# Patient Record
Sex: MALE | Race: WHITE | NOT HISPANIC OR LATINO | Employment: OTHER | URBAN - METROPOLITAN AREA
[De-identification: names, ages, dates, MRNs, and addresses within clinical notes are randomized per-mention and may not be internally consistent; named-entity substitution may affect disease eponyms.]

---

## 2017-09-24 ENCOUNTER — HOSPITAL ENCOUNTER (OUTPATIENT)
Facility: HOSPITAL | Age: 32
Discharge: PSYCHIATRIC OR SUBSTANCE USE FACILITY | End: 2017-09-26
Attending: EMERGENCY MEDICINE | Admitting: HOSPITALIST
Payer: MEDICAID

## 2017-09-24 DIAGNOSIS — T50.902A INTENTIONAL DRUG OVERDOSE, INITIAL ENCOUNTER: Primary | ICD-10-CM

## 2017-09-24 DIAGNOSIS — T50.901A OVERDOSE: ICD-10-CM

## 2017-09-24 LAB
ALBUMIN SERPL BCP-MCNC: 4 G/DL
ALP SERPL-CCNC: 67 U/L
ALT SERPL W/O P-5'-P-CCNC: 15 U/L
ANION GAP SERPL CALC-SCNC: 15 MMOL/L
APAP SERPL-MCNC: <3 UG/ML
AST SERPL-CCNC: 9 U/L
BASOPHILS # BLD AUTO: 0 K/UL
BASOPHILS NFR BLD: 0.4 %
BILIRUB SERPL-MCNC: 0.3 MG/DL
BUN SERPL-MCNC: 6 MG/DL
CALCIUM SERPL-MCNC: 9 MG/DL
CHLORIDE SERPL-SCNC: 104 MMOL/L
CO2 SERPL-SCNC: 22 MMOL/L
CREAT SERPL-MCNC: 0.8 MG/DL
DIFFERENTIAL METHOD: ABNORMAL
EOSINOPHIL # BLD AUTO: 0.1 K/UL
EOSINOPHIL NFR BLD: 0.6 %
ERYTHROCYTE [DISTWIDTH] IN BLOOD BY AUTOMATED COUNT: 14.4 %
EST. GFR  (AFRICAN AMERICAN): >60 ML/MIN/1.73 M^2
EST. GFR  (NON AFRICAN AMERICAN): >60 ML/MIN/1.73 M^2
ETHANOL SERPL-MCNC: <10 MG/DL
GLUCOSE SERPL-MCNC: 175 MG/DL
HCT VFR BLD AUTO: 43.1 %
HGB BLD-MCNC: 14.5 G/DL
LYMPHOCYTES # BLD AUTO: 1.4 K/UL
LYMPHOCYTES NFR BLD: 11.9 %
MCH RBC QN AUTO: 30.7 PG
MCHC RBC AUTO-ENTMCNC: 33.7 G/DL
MCV RBC AUTO: 91 FL
MONOCYTES # BLD AUTO: 0.7 K/UL
MONOCYTES NFR BLD: 5.8 %
NEUTROPHILS # BLD AUTO: 9.4 K/UL
NEUTROPHILS NFR BLD: 81.3 %
PLATELET # BLD AUTO: 239 K/UL
PMV BLD AUTO: 7.7 FL
POTASSIUM SERPL-SCNC: 3.3 MMOL/L
PROT SERPL-MCNC: 7.1 G/DL
RBC # BLD AUTO: 4.73 M/UL
SODIUM SERPL-SCNC: 141 MMOL/L
T4 FREE SERPL-MCNC: 1.31 NG/DL
TSH SERPL DL<=0.005 MIU/L-ACNC: 0.29 UIU/ML
WBC # BLD AUTO: 11.6 K/UL

## 2017-09-24 PROCEDURE — 96375 TX/PRO/DX INJ NEW DRUG ADDON: CPT

## 2017-09-24 PROCEDURE — 25000003 PHARM REV CODE 250: Performed by: EMERGENCY MEDICINE

## 2017-09-24 PROCEDURE — G0378 HOSPITAL OBSERVATION PER HR: HCPCS

## 2017-09-24 PROCEDURE — 93005 ELECTROCARDIOGRAM TRACING: CPT

## 2017-09-24 PROCEDURE — 80053 COMPREHEN METABOLIC PANEL: CPT

## 2017-09-24 PROCEDURE — 84439 ASSAY OF FREE THYROXINE: CPT

## 2017-09-24 PROCEDURE — 63600175 PHARM REV CODE 636 W HCPCS: Performed by: EMERGENCY MEDICINE

## 2017-09-24 PROCEDURE — 93010 ELECTROCARDIOGRAM REPORT: CPT | Mod: ,,, | Performed by: INTERNAL MEDICINE

## 2017-09-24 PROCEDURE — 63600175 PHARM REV CODE 636 W HCPCS: Performed by: NURSE PRACTITIONER

## 2017-09-24 PROCEDURE — 96365 THER/PROPH/DIAG IV INF INIT: CPT

## 2017-09-24 PROCEDURE — 84443 ASSAY THYROID STIM HORMONE: CPT

## 2017-09-24 PROCEDURE — 20000000 HC ICU ROOM

## 2017-09-24 PROCEDURE — 36415 COLL VENOUS BLD VENIPUNCTURE: CPT

## 2017-09-24 PROCEDURE — 99285 EMERGENCY DEPT VISIT HI MDM: CPT

## 2017-09-24 PROCEDURE — 80329 ANALGESICS NON-OPIOID 1 OR 2: CPT

## 2017-09-24 PROCEDURE — 85025 COMPLETE CBC W/AUTO DIFF WBC: CPT

## 2017-09-24 PROCEDURE — 25000003 PHARM REV CODE 250: Performed by: NURSE PRACTITIONER

## 2017-09-24 PROCEDURE — 80320 DRUG SCREEN QUANTALCOHOLS: CPT

## 2017-09-24 PROCEDURE — 96361 HYDRATE IV INFUSION ADD-ON: CPT

## 2017-09-24 RX ORDER — ONDANSETRON 2 MG/ML
8 INJECTION INTRAMUSCULAR; INTRAVENOUS EVERY 8 HOURS PRN
Status: DISCONTINUED | OUTPATIENT
Start: 2017-09-24 | End: 2017-09-26 | Stop reason: HOSPADM

## 2017-09-24 RX ORDER — POTASSIUM CHLORIDE 20 MEQ/1
40 TABLET, EXTENDED RELEASE ORAL ONCE
Status: COMPLETED | OUTPATIENT
Start: 2017-09-25 | End: 2017-09-25

## 2017-09-24 RX ORDER — SODIUM CHLORIDE 9 MG/ML
INJECTION, SOLUTION INTRAVENOUS CONTINUOUS
Status: DISCONTINUED | OUTPATIENT
Start: 2017-09-24 | End: 2017-09-25

## 2017-09-24 RX ORDER — NALOXONE HCL 0.4 MG/ML
0.4 VIAL (ML) INJECTION
Status: DISCONTINUED | OUTPATIENT
Start: 2017-09-24 | End: 2017-09-26 | Stop reason: HOSPADM

## 2017-09-24 RX ORDER — NALOXONE HCL 0.4 MG/ML
2 VIAL (ML) INJECTION
Status: COMPLETED | OUTPATIENT
Start: 2017-09-24 | End: 2017-09-24

## 2017-09-24 RX ORDER — ACETAMINOPHEN 500 MG
1000 TABLET ORAL EVERY 6 HOURS PRN
Status: DISCONTINUED | OUTPATIENT
Start: 2017-09-24 | End: 2017-09-26 | Stop reason: HOSPADM

## 2017-09-24 RX ORDER — POTASSIUM CHLORIDE 7.45 MG/ML
10 INJECTION INTRAVENOUS
Status: DISCONTINUED | OUTPATIENT
Start: 2017-09-24 | End: 2017-09-24

## 2017-09-24 RX ORDER — AMOXICILLIN 250 MG
1 CAPSULE ORAL 2 TIMES DAILY
Status: DISCONTINUED | OUTPATIENT
Start: 2017-09-24 | End: 2017-09-26 | Stop reason: HOSPADM

## 2017-09-24 RX ADMIN — PIPERACILLIN AND TAZOBACTAM 4.5 G: 4; .5 INJECTION, POWDER, LYOPHILIZED, FOR SOLUTION INTRAVENOUS; PARENTERAL at 10:09

## 2017-09-24 RX ADMIN — NALOXONE HYDROCHLORIDE 2 MG: 0.4 INJECTION, SOLUTION INTRAMUSCULAR; INTRAVENOUS; SUBCUTANEOUS at 06:09

## 2017-09-24 RX ADMIN — SODIUM CHLORIDE: 0.9 INJECTION, SOLUTION INTRAVENOUS at 10:09

## 2017-09-24 RX ADMIN — SODIUM CHLORIDE 1000 ML: 0.9 INJECTION, SOLUTION INTRAVENOUS at 08:09

## 2017-09-24 NOTE — ED NOTES
Family at bedside. Patient is more awake at this time secondary to medication. Patient reports he took 50 Robaxin pills and used IV heroin. Patient has home medication of Suboxone which he denies he used today. Patient reports he was attempting to harm himself because his girlfriend slept with someone and he was upset.

## 2017-09-24 NOTE — ED PROVIDER NOTES
"Encounter Date: 9/24/2017    SCRIBE #1 NOTE: I, Yanelis Guadarrama , am scribing for, and in the presence of,  Dr. Belcher . I have scribed the entire note.       History     Chief Complaint   Patient presents with    Drug Overdose     took Robaxin and heroin         09/24/2017  6:48 PM     Chief Complaint: Drug OD      The patient is a 32 y.o. male who is presenting to the ED per EMS s/p intentional drug OD. Per EMS and pt's family members, the pt took x 50 Robaxin (dosage unknown) and shot up heroin in his L EJ at approximately 17:00 this evening. At arrival to scene EMS states that the pt was minimally responsive to painful stimuli with improvement in GCS s/p .5mg Narcan intranasal and 1.5 mg given IV. While in ED the pt admits to SI stating that he "was trying to hurt himself" after learning that his girlfriend slept with another man. No HI, delusions, auditory hallucinations, or visual hallucinations. Pertinent past surgical hx includes anxiety. No pertinent past surgical hx.           The history is provided by the patient and medical records.     Review of patient's allergies indicates:  No Known Allergies  Past Medical History:   Diagnosis Date    Anxiety     depression    Breast abscess in male     right     Past Surgical History:   Procedure Laterality Date    BREAST SURGERY      I & D right     History reviewed. No pertinent family history.  Social History   Substance Use Topics    Smoking status: Current Every Day Smoker     Packs/day: 1.00     Years: 15.00    Smokeless tobacco: Never Used    Alcohol use No     Review of Systems   Unable to perform ROS: Mental status change       Physical Exam     Initial Vitals [09/24/17 1821]   BP Pulse Resp Temp SpO2   130/79 95 (!) 22 -- 100 %      MAP       96         Physical Exam    Nursing note and vitals reviewed.  Constitutional: He appears lethargic.   HENT:   Head: Normocephalic and atraumatic.   Mouth/Throat: Oropharynx is clear and moist.   Eyes: EOM are " normal. Pupils are equal, round, and reactive to light.   Pupils are 4 mm in diameter bilaterally.    Neck: Normal range of motion.   Cardiovascular: Normal rate, regular rhythm, normal heart sounds and intact distal pulses. Exam reveals no gallop and no friction rub.    No murmur heard.  Pulmonary/Chest: Breath sounds normal. He has no wheezes. He has no rhonchi. He has no rales.   Neurological: He appears lethargic. GCS eye subscore is 4. GCS verbal subscore is 5. GCS motor subscore is 6.   Intoxicated appearing. Oriented to year and person.    Skin: Skin is warm and dry. Capillary refill takes less than 2 seconds.         ED Course   Procedures  Labs Reviewed   CBC W/ AUTO DIFFERENTIAL - Abnormal; Notable for the following:        Result Value    MPV 7.7 (*)     Gran # 9.4 (*)     Gran% 81.3 (*)     Lymph% 11.9 (*)     All other components within normal limits   COMPREHENSIVE METABOLIC PANEL - Abnormal; Notable for the following:     Potassium 3.3 (*)     CO2 22 (*)     Glucose 175 (*)     AST 9 (*)     All other components within normal limits   TSH - Abnormal; Notable for the following:     TSH 0.290 (*)     All other components within normal limits   ACETAMINOPHEN LEVEL - Abnormal; Notable for the following:     Acetaminophen (Tylenol), Serum <3.0 (*)     All other components within normal limits   ALCOHOL,MEDICAL (ETHANOL)   T4, FREE   URINALYSIS   DRUG SCREEN PANEL, URINE EMERGENCY     EKG Readings: (Independently Interpreted)   Initial Reading: No STEMI.   Sinus tachycardia, 110 BPM, normal axis, normal intervals, nonspecific T-wave abnormality, normal ST segments       X-Rays:   Independently Interpreted Readings:   Chest X-Ray: Right perihilar opacity concerning for possible aspiration     Medical Decision Making:   History:   Old Medical Records: I decided to obtain old medical records.  Initial Assessment:   This is an emergent evaluation for psychiatric evaluation.  The pt presents for evaluation of  32-year-old male with intentional drug overdose because his girlfriend cheated on him and he was angry and hurt.    I feel the pt is a danger to himself and potentially suicidal and pt was placed under PEC with direct observation.  Medical workup was initiated to evaluate for organic etiologies.  Pt's etoh level was negative  I do not believe the pt has metabolic encephalopathy, CVA, severe electrolyte derrangement, drug induced temporary psychosis.    Patient took allegedly over 50 Robaxin pills putting his life at risk.  He is quite under the influence.  Initially tachycardic concerning for anticholinergic syndrome which has improved with supportive therapy.  He has been maintaining his oxygen saturations and has not required intubation.  He is still very altered.  I cannot medically clear patient.  He will be admitted to the intensive care unit.  Patient has been placed under a physician's emergency certificate.              Scribe Attestation:   Scribe #1: I performed the above scribed service and the documentation accurately describes the services I performed. I attest to the accuracy of the note.    Attending Attestation:           Physician Attestation for Scribe:  Physician Attestation Statement for Scribe #1: I, Dr. Belcher , reviewed documentation, as scribed by Yanelis Guadarrama  in my presence, and it is both accurate and complete.                 ED Course      Clinical Impression:   Diagnoses of Overdose and Intentional drug overdose, initial encounter were pertinent to this visit.                           Alfredo Belcher MD  09/24/17 3577

## 2017-09-25 PROBLEM — E87.6 HYPOKALEMIA: Status: ACTIVE | Noted: 2017-09-25

## 2017-09-25 PROBLEM — F19.90 IVDU (INTRAVENOUS DRUG USER): Status: ACTIVE | Noted: 2017-09-25

## 2017-09-25 PROBLEM — F11.20 UNCOMPLICATED OPIOID DEPENDENCE: Status: ACTIVE | Noted: 2017-09-25

## 2017-09-25 PROBLEM — B19.20 HEPATITIS C VIRUS INFECTION WITHOUT HEPATIC COMA: Status: ACTIVE | Noted: 2017-09-25

## 2017-09-25 PROBLEM — G89.4 CHRONIC PAIN SYNDROME: Status: ACTIVE | Noted: 2017-09-25

## 2017-09-25 PROBLEM — J69.0 ASPIRATION PNEUMONIA: Status: ACTIVE | Noted: 2017-09-25

## 2017-09-25 LAB
ALBUMIN SERPL BCP-MCNC: 3.3 G/DL
ALP SERPL-CCNC: 59 U/L
ALT SERPL W/O P-5'-P-CCNC: 10 U/L
AMPHET+METHAMPHET UR QL: NEGATIVE
ANION GAP SERPL CALC-SCNC: 7 MMOL/L
AST SERPL-CCNC: 11 U/L
BARBITURATES UR QL SCN>200 NG/ML: NORMAL
BASOPHILS # BLD AUTO: 0.2 K/UL
BASOPHILS NFR BLD: 2.3 %
BENZODIAZ UR QL SCN>200 NG/ML: NEGATIVE
BILIRUB SERPL-MCNC: 0.5 MG/DL
BILIRUB UR QL STRIP: NEGATIVE
BUN SERPL-MCNC: 4 MG/DL
BZE UR QL SCN: NEGATIVE
CALCIUM SERPL-MCNC: 8.7 MG/DL
CANNABINOIDS UR QL SCN: NEGATIVE
CHLORIDE SERPL-SCNC: 108 MMOL/L
CLARITY UR: CLEAR
CO2 SERPL-SCNC: 24 MMOL/L
COLOR UR: YELLOW
CREAT SERPL-MCNC: 0.7 MG/DL
CREAT UR-MCNC: 155 MG/DL
DIFFERENTIAL METHOD: ABNORMAL
EOSINOPHIL # BLD AUTO: 0.2 K/UL
EOSINOPHIL NFR BLD: 1.8 %
ERYTHROCYTE [DISTWIDTH] IN BLOOD BY AUTOMATED COUNT: 14.2 %
EST. GFR  (AFRICAN AMERICAN): >60 ML/MIN/1.73 M^2
EST. GFR  (NON AFRICAN AMERICAN): >60 ML/MIN/1.73 M^2
GLUCOSE SERPL-MCNC: 119 MG/DL
GLUCOSE UR QL STRIP: NEGATIVE
HCT VFR BLD AUTO: 38.9 %
HGB BLD-MCNC: 13.1 G/DL
HGB UR QL STRIP: NEGATIVE
HIV1+2 IGG SERPL QL IA.RAPID: NEGATIVE
KETONES UR QL STRIP: NEGATIVE
LEUKOCYTE ESTERASE UR QL STRIP: NEGATIVE
LYMPHOCYTES # BLD AUTO: 2.4 K/UL
LYMPHOCYTES NFR BLD: 24.5 %
MAGNESIUM SERPL-MCNC: 2.3 MG/DL
MCH RBC QN AUTO: 30.7 PG
MCHC RBC AUTO-ENTMCNC: 33.6 G/DL
MCV RBC AUTO: 91 FL
METHADONE UR QL SCN>300 NG/ML: NEGATIVE
MONOCYTES # BLD AUTO: 0.6 K/UL
MONOCYTES NFR BLD: 6 %
NEUTROPHILS # BLD AUTO: 6.4 K/UL
NEUTROPHILS NFR BLD: 65.4 %
NITRITE UR QL STRIP: NEGATIVE
OPIATES UR QL SCN: NORMAL
PCP UR QL SCN>25 NG/ML: NEGATIVE
PH UR STRIP: 5 [PH] (ref 5–8)
PHOSPHATE SERPL-MCNC: 2.6 MG/DL
PLATELET # BLD AUTO: 223 K/UL
PMV BLD AUTO: 8.3 FL
POTASSIUM SERPL-SCNC: 3.8 MMOL/L
PROT SERPL-MCNC: 6.1 G/DL
PROT UR QL STRIP: ABNORMAL
RBC # BLD AUTO: 4.26 M/UL
SODIUM SERPL-SCNC: 139 MMOL/L
SP GR UR STRIP: 1.02 (ref 1–1.03)
TOXICOLOGY INFORMATION: NORMAL
URN SPEC COLLECT METH UR: ABNORMAL
UROBILINOGEN UR STRIP-ACNC: 1 EU/DL
WBC # BLD AUTO: 9.7 K/UL

## 2017-09-25 PROCEDURE — 80307 DRUG TEST PRSMV CHEM ANLYZR: CPT

## 2017-09-25 PROCEDURE — S4991 NICOTINE PATCH NONLEGEND: HCPCS | Performed by: NURSE PRACTITIONER

## 2017-09-25 PROCEDURE — 63600175 PHARM REV CODE 636 W HCPCS: Performed by: PSYCHIATRY & NEUROLOGY

## 2017-09-25 PROCEDURE — 25000003 PHARM REV CODE 250: Performed by: HOSPITALIST

## 2017-09-25 PROCEDURE — 86703 HIV-1/HIV-2 1 RESULT ANTBDY: CPT

## 2017-09-25 PROCEDURE — 85025 COMPLETE CBC W/AUTO DIFF WBC: CPT

## 2017-09-25 PROCEDURE — 36415 COLL VENOUS BLD VENIPUNCTURE: CPT

## 2017-09-25 PROCEDURE — 25000003 PHARM REV CODE 250: Performed by: PSYCHIATRY & NEUROLOGY

## 2017-09-25 PROCEDURE — 84100 ASSAY OF PHOSPHORUS: CPT

## 2017-09-25 PROCEDURE — 81003 URINALYSIS AUTO W/O SCOPE: CPT

## 2017-09-25 PROCEDURE — 80053 COMPREHEN METABOLIC PANEL: CPT

## 2017-09-25 PROCEDURE — 20000000 HC ICU ROOM

## 2017-09-25 PROCEDURE — G0378 HOSPITAL OBSERVATION PER HR: HCPCS

## 2017-09-25 PROCEDURE — 63600175 PHARM REV CODE 636 W HCPCS: Performed by: HOSPITALIST

## 2017-09-25 PROCEDURE — 83735 ASSAY OF MAGNESIUM: CPT

## 2017-09-25 PROCEDURE — 94640 AIRWAY INHALATION TREATMENT: CPT

## 2017-09-25 PROCEDURE — 94761 N-INVAS EAR/PLS OXIMETRY MLT: CPT

## 2017-09-25 PROCEDURE — 25000242 PHARM REV CODE 250 ALT 637 W/ HCPCS: Performed by: HOSPITALIST

## 2017-09-25 PROCEDURE — 25000003 PHARM REV CODE 250: Performed by: NURSE PRACTITIONER

## 2017-09-25 RX ORDER — HALOPERIDOL 5 MG/ML
5 INJECTION INTRAMUSCULAR EVERY 6 HOURS PRN
Status: DISCONTINUED | OUTPATIENT
Start: 2017-09-25 | End: 2017-09-26 | Stop reason: HOSPADM

## 2017-09-25 RX ORDER — IPRATROPIUM BROMIDE AND ALBUTEROL SULFATE 2.5; .5 MG/3ML; MG/3ML
3 SOLUTION RESPIRATORY (INHALATION) EVERY 6 HOURS
Status: DISCONTINUED | OUTPATIENT
Start: 2017-09-25 | End: 2017-09-26

## 2017-09-25 RX ORDER — GABAPENTIN 300 MG/1
CAPSULE ORAL
Status: DISPENSED
Start: 2017-09-25 | End: 2017-09-26

## 2017-09-25 RX ORDER — BUPRENORPHINE AND NALOXONE 8; 2 MG/1; MG/1
1 FILM, SOLUBLE BUCCAL; SUBLINGUAL 2 TIMES DAILY
COMMUNITY
End: 2019-08-09

## 2017-09-25 RX ORDER — CLONIDINE HYDROCHLORIDE 0.1 MG/1
TABLET ORAL
Status: DISPENSED
Start: 2017-09-25 | End: 2017-09-26

## 2017-09-25 RX ORDER — HALOPERIDOL 5 MG/ML
INJECTION INTRAMUSCULAR
Status: DISPENSED
Start: 2017-09-25 | End: 2017-09-26

## 2017-09-25 RX ORDER — MOXIFLOXACIN HYDROCHLORIDE 400 MG/1
400 TABLET ORAL DAILY
Status: DISCONTINUED | OUTPATIENT
Start: 2017-09-25 | End: 2017-09-26 | Stop reason: HOSPADM

## 2017-09-25 RX ORDER — CLONIDINE HYDROCHLORIDE 0.1 MG/1
0.1 TABLET ORAL EVERY 8 HOURS
Status: DISCONTINUED | OUTPATIENT
Start: 2017-09-25 | End: 2017-09-26 | Stop reason: HOSPADM

## 2017-09-25 RX ORDER — GABAPENTIN 300 MG/1
600 CAPSULE ORAL 3 TIMES DAILY
Status: DISCONTINUED | OUTPATIENT
Start: 2017-09-25 | End: 2017-09-26 | Stop reason: HOSPADM

## 2017-09-25 RX ORDER — IBUPROFEN 200 MG
1 TABLET ORAL DAILY
Status: DISCONTINUED | OUTPATIENT
Start: 2017-09-25 | End: 2017-09-25

## 2017-09-25 RX ORDER — BUPRENORPHINE AND NALOXONE 8; 2 MG/1; MG/1
FILM, SOLUBLE BUCCAL; SUBLINGUAL
Status: DISPENSED
Start: 2017-09-25 | End: 2017-09-26

## 2017-09-25 RX ORDER — IBUPROFEN 200 MG
1 TABLET ORAL DAILY
Status: DISCONTINUED | OUTPATIENT
Start: 2017-09-25 | End: 2017-09-26 | Stop reason: HOSPADM

## 2017-09-25 RX ORDER — BUPRENORPHINE AND NALOXONE 8; 2 MG/1; MG/1
1 FILM, SOLUBLE BUCCAL; SUBLINGUAL DAILY
Status: DISCONTINUED | OUTPATIENT
Start: 2017-09-25 | End: 2017-09-26 | Stop reason: HOSPADM

## 2017-09-25 RX ADMIN — CLONIDINE HYDROCHLORIDE 0.1 MG: 0.1 TABLET ORAL at 09:09

## 2017-09-25 RX ADMIN — HALOPERIDOL LACTATE 5 MG: 5 INJECTION, SOLUTION INTRAMUSCULAR at 09:09

## 2017-09-25 RX ADMIN — CLONIDINE HYDROCHLORIDE 0.1 MG: 0.1 TABLET ORAL at 03:09

## 2017-09-25 RX ADMIN — GABAPENTIN 600 MG: 300 CAPSULE ORAL at 03:09

## 2017-09-25 RX ADMIN — IPRATROPIUM BROMIDE AND ALBUTEROL SULFATE 3 ML: .5; 3 SOLUTION RESPIRATORY (INHALATION) at 11:09

## 2017-09-25 RX ADMIN — NICOTINE 1 PATCH: 21 PATCH, EXTENDED RELEASE TRANSDERMAL at 05:09

## 2017-09-25 RX ADMIN — BUPRENORPHINE HYDROCHLORIDE, NALOXONE HYDROCHLORIDE 1 EACH: 8; 2 FILM, SOLUBLE BUCCAL; SUBLINGUAL at 03:09

## 2017-09-25 RX ADMIN — MOXIFLOXACIN HYDROCHLORIDE 400 MG: 400 TABLET, FILM COATED ORAL at 09:09

## 2017-09-25 RX ADMIN — POTASSIUM CHLORIDE 40 MEQ: 1500 TABLET, EXTENDED RELEASE ORAL at 12:09

## 2017-09-25 RX ADMIN — STANDARDIZED SENNA CONCENTRATE AND DOCUSATE SODIUM 1 TABLET: 8.6; 5 TABLET, FILM COATED ORAL at 12:09

## 2017-09-25 RX ADMIN — GABAPENTIN 600 MG: 300 CAPSULE ORAL at 09:09

## 2017-09-25 NOTE — ED NOTES
Poison Control Contacted, symptomatic control only. Do not recommend charcoal. Monitor for agitation or seizures. Obtain Psych Eval, Tylenol and Urine Drug levels.

## 2017-09-25 NOTE — PROGRESS NOTES
Family at bedside as Nilsa King NP talks with the pt.  Pt agreeable to mother remaining in room for talk.  Vitals remain stable.  Pt somewhat calm and cooperative.

## 2017-09-25 NOTE — PROGRESS NOTES
Family in to bring pt food and visit.  Pt easily becomes agitated then switches back to calm and cooperative in seconds.  Rules and guidelines of staying in the ICU as a PEC pt explained to pt and family.  Vital signs stable.  Pt able to carry on clear conversation.  No acute distress noted.  Sitter outside room.

## 2017-09-25 NOTE — PLAN OF CARE
09/25/17 0445   Patient Assessment/Suction   Level of Consciousness (AVPU) alert   Respiratory Effort Normal;Unlabored   Expansion/Accessory Muscles/Retractions no use of accessory muscles   PRE-TX-O2-ETCO2   O2 Device (Oxygen Therapy) room air   SpO2 96 %   Pulse 71   Resp 13   Pt tolerates RA well.

## 2017-09-25 NOTE — HPI
"Srinivasa Bui is a 32 y.o. Male with PMHx significant for anxiety and opioid dependence, as well as IVDU and recently diagnosed Hep C.  He was admitted to the service of hospital medicine PEC'd after intentional OD.  He presented to the ED per EMS s/p intentional drug OD. Per EMS and pt's family members, the pt took x 50 Robaxin (dosage unknown)  at approximately 17:00 this evening. At arrival to scene EMS states that the pt was minimally responsive to painful stimuli with improvement in GCS s/p .5mg Narcan intranasal and 1.5 mg given IV. While in ED the pt admits to SI stating that he "was trying to hurt himself" after learning that his girlfriend slept with another man. No HI, delusions, auditory hallucinations, or visual hallucinations. At present, he admits to taking medication because he was angry but denies intent to self harm.  Reports history of SI with 2 previous inpatient psychiatric hospitalizations.  He reports he is being managed by [facility] for his heroin addiction and is receiving vivitrol injections.  He reports he injected heroin into his LEFT neck at about noon today, however he denies any "real high" because of the vivitrol.  Prior to that, he last used 1 week ago.  He denies sharing needles.  He endorses he was diagnosed with Hep C last week at [facility].  He also reports 2-3 days of cough with thick, brown sputum production.  Denies fever or chills.  No chest pain.  Other pertinent history as below:  "

## 2017-09-25 NOTE — PROGRESS NOTES
09/25/17 1138   Patient Assessment/Suction   Level of Consciousness (AVPU) alert   All Lung Fields Breath Sounds clear   Cough Type none   PRE-TX-O2-ETCO2   O2 Device (Oxygen Therapy) room air   SpO2 98 %   Pulse Oximetry Type Continuous   $ Pulse Oximetry - Multiple Charge Pulse Oximetry - Multiple   Pulse 75   Resp 14   Aerosol Therapy   $ Aerosol Therapy Charges Aerosol Treatment   Respiratory Treatment Status given   SVN/Inhaler Treatment Route mask   Position During Treatment HOB at 30 degrees   Patient Tolerance good   Post-Treatment   Post-treatment Heart Rate (beats/min) 78   Post-treatment Resp Rate (breaths/min) 15   All Fields Breath Sounds clear

## 2017-09-25 NOTE — SUBJECTIVE & OBJECTIVE
Past Medical History:   Diagnosis Date    Anxiety     depression    Breast abscess in male     right    Opioid dependence        Past Surgical History:   Procedure Laterality Date    BREAST SURGERY      I & D right       Review of patient's allergies indicates:  No Known Allergies    No current facility-administered medications on file prior to encounter.      Current Outpatient Prescriptions on File Prior to Encounter   Medication Sig    cyclobenzaprine (FLEXERIL) 5 MG tablet Take 5 mg by mouth 3 (three) times daily as needed for Muscle spasms.    gabapentin (NEURONTIN) 300 MG capsule Take 300 mg by mouth 3 (three) times daily.    hydrocodone-acetaminophen 7.5-325mg (NORCO) 7.5-325 mg per tablet Take 1 tablet by mouth every 6 (six) hours as needed for Pain.     Family History     Problem Relation (Age of Onset)    Diabetes Father    Heart disease Father        Social History Main Topics    Smoking status: Current Every Day Smoker     Packs/day: 2.00     Years: 15.00    Smokeless tobacco: Never Used    Alcohol use Yes      Comment: occasional    Drug use:      Types: Heroin    Sexual activity: Yes     Review of Systems   Constitutional: Negative for activity change, appetite change, chills and fever.   HENT: Negative for dental problem, hearing loss, sinus pressure, sore throat and trouble swallowing.    Eyes: Negative for photophobia and visual disturbance.   Respiratory: Positive for cough, shortness of breath and wheezing.    Cardiovascular: Negative for chest pain, palpitations and leg swelling.   Gastrointestinal: Negative for abdominal distention, constipation, diarrhea, nausea and vomiting.   Genitourinary: Negative for difficulty urinating, dysuria, flank pain and hematuria.   Musculoskeletal: Negative for arthralgias, back pain and neck pain.   Neurological: Positive for headaches. Negative for dizziness, seizures, weakness and light-headedness.   Psychiatric/Behavioral: Positive for agitation,  self-injury and suicidal ideas. The patient is nervous/anxious.      Objective:     Vital Signs (Most Recent):  Temp: 97.6 °F (36.4 °C) (09/24/17 2317)  Pulse: 90 (09/24/17 2317)  Resp: 18 (09/24/17 2317)  BP: (!) 125/91 (09/24/17 2323)  SpO2: (!) 93 % (09/24/17 2317) Vital Signs (24h Range):  Temp:  [97.6 °F (36.4 °C)] 97.6 °F (36.4 °C)  Pulse:  [76-95] 90  Resp:  [18-22] 18  SpO2:  [92 %-100 %] 93 %  BP: (101-130)/(55-91) 125/91     Weight: 86.1 kg (189 lb 13.1 oz)  Body mass index is 23.11 kg/m².    Physical Exam   Constitutional: He appears well-developed and well-nourished. No distress.   HENT:   Head: Normocephalic and atraumatic.   Eyes: Conjunctivae and EOM are normal. Pupils are equal, round, and reactive to light.   Neck: Normal range of motion. Neck supple. No thyromegaly present.   Cardiovascular: Normal rate, regular rhythm, normal heart sounds and intact distal pulses.    No murmur heard.  Pulmonary/Chest: Effort normal. He has wheezes (diffuse expiratory wheezing).   RIGHT lobar rhonchi     Abdominal: Soft. Bowel sounds are normal. He exhibits no distension. There is no tenderness.   Musculoskeletal: Normal range of motion. He exhibits no edema or tenderness.   Neurological: He is alert. No cranial nerve deficit.   Drowsy; slurred speech.  Alert, oriented x 3.     Skin: Skin is warm and dry. Capillary refill takes less than 2 seconds.   Multiple tattoos; small punctate area of ecchymosis to LEFT EJ.   Psychiatric:   Admits to history of SI; denies SI or SA with drug overdose.  Lacks insight.        Significant Labs:   CBC:   Recent Labs  Lab 09/24/17 1904   WBC 11.60   HGB 14.5   HCT 43.1        CMP:   Recent Labs  Lab 09/24/17 1904      K 3.3*      CO2 22*   *   BUN 6   CREATININE 0.8   CALCIUM 9.0   PROT 7.1   ALBUMIN 4.0   BILITOT 0.3   ALKPHOS 67   AST 9*   ALT 15   ANIONGAP 15   EGFRNONAA >60        Ref. Range 9/24/2017 19:04   TSH Latest Ref Range: 0.400 - 4.000  uIU/mL 0.290 (L)   Free T4 Latest Ref Range: 0.71 - 1.51 ng/dL 1.31   Acetaminophen (Tylenol), Serum Latest Ref Range: 10.0 - 20.0 ug/mL <3.0 (L)   Alcohol, Medical, Serum Latest Ref Range: <10 mg/dL <10     Significant Imaging:   CXR: possible RML infiltrate (my interpretation)

## 2017-09-25 NOTE — PROGRESS NOTES
Pt resting well.  Appears asleep.  Vitals remain stable.  Continuing to monitor.  Sitter remains at door of room.  Curtain drawn but not closed per policy.

## 2017-09-25 NOTE — PROGRESS NOTES
09/25/17 0755   Patient Assessment/Suction   Level of Consciousness (AVPU) alert   PRE-TX-O2-ETCO2   O2 Device (Oxygen Therapy) room air   SpO2 97 %   Pulse Oximetry Type Continuous   $ Pulse Oximetry - Multiple Charge Pulse Oximetry - Multiple   Pulse 74   Resp 15

## 2017-09-25 NOTE — H&P
"[facility] Hospital Medicine  History & Physical    Patient Name: Srinivasa Bui  MRN: [id number]  Admission Date: 9/24/2017  Attending Physician: Niru Ochoa MD   Primary Care Provider: Primary Doctor No         Patient information was obtained from patient, parent and ER records.     Subjective:     Principal Problem:Intentional drug overdose    Chief Complaint:   Chief Complaint   Patient presents with    Drug Overdose     took Robaxin and heroin        HPI: Srinivasa Bui is a 32 y.o. Male with PMHx significant for anxiety and opioid dependence, as well as IVDU and recently diagnosed Hep C.  He was admitted to the service of hospital medicine PEC'd after intentional OD.  He presented to the ED per EMS s/p intentional drug OD. Per EMS and pt's family members, the pt took x 50 Robaxin (dosage unknown)  at approximately 17:00 this evening. At arrival to scene EMS states that the pt was minimally responsive to painful stimuli with improvement in GCS s/p .5mg Narcan intranasal and 1.5 mg given IV. While in ED the pt admits to SI stating that he "was trying to hurt himself" after learning that his girlfriend slept with another man. No HI, delusions, auditory hallucinations, or visual hallucinations. At present, he admits to taking medication because he was angry but denies intent to self harm.  Reports history of SI with 2 previous inpatient psychiatric hospitalizations.  He reports he is being managed by [facility] for his heroin addiction and is receiving vivitrol injections.  He reports he injected heroin into his LEFT neck at about noon today, however he denies any "real high" because of the vivitrol.  Prior to that, he last used 1 week ago.  He denies sharing needles.  He endorses he was diagnosed with Hep C last week at [facility].  He also reports 2-3 days of cough with thick, brown sputum production.  Denies fever or chills.  No chest pain.  Other pertinent history as below:    Past " Medical History:   Diagnosis Date    Anxiety     depression    Breast abscess in male     right    Opioid dependence        Past Surgical History:   Procedure Laterality Date    BREAST SURGERY      I & D right       Review of patient's allergies indicates:  No Known Allergies    No current facility-administered medications on file prior to encounter.      Current Outpatient Prescriptions on File Prior to Encounter   Medication Sig    cyclobenzaprine (FLEXERIL) 5 MG tablet Take 5 mg by mouth 3 (three) times daily as needed for Muscle spasms.    gabapentin (NEURONTIN) 300 MG capsule Take 300 mg by mouth 3 (three) times daily.    hydrocodone-acetaminophen 7.5-325mg (NORCO) 7.5-325 mg per tablet Take 1 tablet by mouth every 6 (six) hours as needed for Pain.     Family History     Problem Relation (Age of Onset)    Diabetes Father    Heart disease Father        Social History Main Topics    Smoking status: Current Every Day Smoker     Packs/day: 2.00     Years: 15.00    Smokeless tobacco: Never Used    Alcohol use Yes      Comment: occasional    Drug use:      Types: Heroin    Sexual activity: Yes     Review of Systems   Constitutional: Negative for activity change, appetite change, chills and fever.   HENT: Negative for dental problem, hearing loss, sinus pressure, sore throat and trouble swallowing.    Eyes: Negative for photophobia and visual disturbance.   Respiratory: Positive for cough, shortness of breath and wheezing.    Cardiovascular: Negative for chest pain, palpitations and leg swelling.   Gastrointestinal: Negative for abdominal distention, constipation, diarrhea, nausea and vomiting.   Genitourinary: Negative for difficulty urinating, dysuria, flank pain and hematuria.   Musculoskeletal: Negative for arthralgias, back pain and neck pain.   Neurological: Positive for headaches. Negative for dizziness, seizures, weakness and light-headedness.   Psychiatric/Behavioral: Positive for agitation,  self-injury and suicidal ideas. The patient is nervous/anxious.      Objective:     Vital Signs (Most Recent):  Temp: 97.6 °F (36.4 °C) (09/24/17 2317)  Pulse: 90 (09/24/17 2317)  Resp: 18 (09/24/17 2317)  BP: (!) 125/91 (09/24/17 2323)  SpO2: (!) 93 % (09/24/17 2317) Vital Signs (24h Range):  Temp:  [97.6 °F (36.4 °C)] 97.6 °F (36.4 °C)  Pulse:  [76-95] 90  Resp:  [18-22] 18  SpO2:  [92 %-100 %] 93 %  BP: (101-130)/(55-91) 125/91     Weight: 86.1 kg (189 lb 13.1 oz)  Body mass index is 23.11 kg/m².    Physical Exam   Constitutional: He appears well-developed and well-nourished. No distress.   HENT:   Head: Normocephalic and atraumatic.   Eyes: Conjunctivae and EOM are normal. Pupils are equal, round, and reactive to light.   Neck: Normal range of motion. Neck supple. No thyromegaly present.   Cardiovascular: Normal rate, regular rhythm, normal heart sounds and intact distal pulses.    No murmur heard.  Pulmonary/Chest: Effort normal. He has wheezes (diffuse expiratory wheezing).   RIGHT lobar rhonchi     Abdominal: Soft. Bowel sounds are normal. He exhibits no distension. There is no tenderness.   Musculoskeletal: Normal range of motion. He exhibits no edema or tenderness.   Neurological: He is alert. No cranial nerve deficit.   Drowsy; slurred speech.  Alert, oriented x 3.     Skin: Skin is warm and dry. Capillary refill takes less than 2 seconds.   Multiple tattoos; small punctate area of ecchymosis to LEFT EJ.   Psychiatric:   Admits to history of SI; denies SI or SA with drug overdose.  Lacks insight.        Significant Labs:   CBC:   Recent Labs  Lab 09/24/17 1904   WBC 11.60   HGB 14.5   HCT 43.1        CMP:   Recent Labs  Lab 09/24/17 1904      K 3.3*      CO2 22*   *   BUN 6   CREATININE 0.8   CALCIUM 9.0   PROT 7.1   ALBUMIN 4.0   BILITOT 0.3   ALKPHOS 67   AST 9*   ALT 15   ANIONGAP 15   EGFRNONAA >60        Ref. Range 9/24/2017 19:04   TSH Latest Ref Range: 0.400 - 4.000  uIU/mL 0.290 (L)   Free T4 Latest Ref Range: 0.71 - 1.51 ng/dL 1.31   Acetaminophen (Tylenol), Serum Latest Ref Range: 10.0 - 20.0 ug/mL <3.0 (L)   Alcohol, Medical, Serum Latest Ref Range: <10 mg/dL <10     Significant Imaging:   CXR: possible RML infiltrate (my interpretation)    Assessment/Plan:     * Intentional drug overdose    Reportedly OD with Robaxin and heroin.  PEC in place.  Psych consultation. Poison control notified-supportive care indicated.  Avoid sedating medication. Once patient more alert, may be medically cleared for discharge to psych facility once accepted.            Aspiration pneumonia    Concern for aspiration pneumonia in patient with OD and decreased LOC PTA. Will cover for both CAP and aspiration PNA with oral avelox daily.  Utilize aspiration precautions. No hypoxia or evidence of sepsis noted, can discharge to psych facility on oral course of avelox once more alert-- wouldn't hinder medical clearance.          Hypokalemia    Replete.  Follow renal panel.          Chronic pain syndrome  Uncomplicated opioid dependence    Opioid dependence managed by [facility].  Monitor closely for withdrawal.  Reports dosed with Vivitrol last week. F/u for continued management upon discharge.          IVDU (intravenous drug user)    Counseled on avoidance.  Check rapid HIV.          Hepatitis C virus infection without hepatic coma    No evidence of cirrhosis.  No acute management indicated.  Check HIV. F/u with hepatologist upon discharge.         VTE Risk Mitigation         Ordered     Low Risk of VTE  Once      09/24/17 3860        Critical care time spent on the evaluation and treatment of patient with drug overdose at risk for cardiopulmonary compromise, review of pertinent labs and imaging studies, discussions with consulting providers and discussions with patient/family: 45 minutes.     Nilsa King NP  Department of Hospital Medicine   [facility]

## 2017-09-25 NOTE — PLAN OF CARE
Problem: Patient Care Overview  Goal: Individualization & Mutuality  Outcome: Ongoing (interventions implemented as appropriate)  2315 pt received to room 505 from er,alert and oriented,vs stable,pt is pec,sitter at bedside,Don RN assumed care of pt upon admission to icu

## 2017-09-25 NOTE — PLAN OF CARE
Pt is under a PEC.  Met with pt to complete his assessment.  Pt states that he currently lives at his mother's home with his mother, grandfather and brother, denies any home DME.  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 Pt verified his PCP as Dr. Lorena Mckenzie in [place] at [facility] and insurance as Medicaid [facility].  Updated admissions regarding pt's PCP.      Pt denies being under psychiatric treatment or having any history of inpatient psychiatric hospitalizations.  Pt stated that he was scheduled to start treatment at [facility] today.  Plan was for him to go to their Mon/Wed/Fri program.  Pt stated that they had recommended group treatment as well as individual counseling and that the clinic would also be giving him his vivitrol injections.  Pt aware of the PEC, denies suicidal ideations and stating that he wants to go home.  Informed pt that plan was for Dr. Molina, the psychiatrist to come by and evaluate him.  Plan for CM to call for placement when pt is medically cleared for transfer.      09/25/17 1125   Discharge Assessment   Assessment Type Discharge Planning Assessment   Confirmed/corrected address and phone number on facesheet? Yes   Assessment information obtained from? Patient   Prior to hospitilization cognitive status: Alert/Oriented   Prior to hospitalization functional status: Independent   Current cognitive status: Alert/Oriented   Current Functional Status: Independent   Lives With parent(s);grandparent(s);sibling(s)  (pt lives wiht his mother, brother and grandfather in the mother's home)   Able to Return to Prior Arrangements no  (pt is under a PEC, will discharge to psych facility)   Who are your caregiver(s) and their phone number(s)? (mother Oly Bui, [phone])   Patient's perception of discharge disposition other (comments)  (pt states home however pt is under a PEC)   Readmission Within The Last 30 Days no previous admission in  last 30 days   Patient currently being followed by outpatient case management? Yes   If yes, name of outpatient case management following: insurance company assigned oupatient case management  (pt doesn not know their name)   Patient currently receives any other outside agency services? No   Equipment Currently Used at Home none   Do you have any problems affording any of your prescribed medications? No  (pharmacy is [facility] on [address])   Is the patient taking medications as prescribed? no   Does the patient have transportation home? (pt overdoesd on robaxin)   Transportation Available family or friend will provide   Does the patient receive services at the Coumadin Clinic? No   Discharge Plan A Psychiatric hospital   Patient/Family In Agreement With Plan yes

## 2017-09-25 NOTE — ASSESSMENT & PLAN NOTE
Opioid dependence managed by [facility].  Monitor closely for withdrawal.  Reports dosed with Vivitrol last week. F/u for continued management upon discharge.

## 2017-09-25 NOTE — ED NOTES
PEC faxed to and called into both [facility] and [facility] office.

## 2017-09-25 NOTE — PROGRESS NOTES
"Spoke to pt's mother who states that the pt asked her to get in touch with his drug dealer via a friend to get him some heroin and bring it to him. And also asked her to bring his suboxone up here. His mother states she told him no and that he spoke "ugly" to her. She explained that she was concerned that if he left this facility without going somewhere for help he would "be right back in there if he made it".   "

## 2017-09-25 NOTE — ASSESSMENT & PLAN NOTE
No evidence of cirrhosis.  No acute management indicated.  Check HIV. F/u with hepatologist upon discharge.

## 2017-09-25 NOTE — PLAN OF CARE
Problem: Patient Care Overview  Goal: Plan of Care Review  Outcome: Revised  Pt with no falls or injury this shift; sitter at bedside throughout the shift; pt started back on subaxone; other meds adjusted per MD; pt CEC'd per Dr. Liu today; can look for placement once medically cleared; will continue current plan of care at this time

## 2017-09-25 NOTE — ASSESSMENT & PLAN NOTE
Concern for aspiration pneumonia in patient with OD and decreased LOC PTA. Will cover for both CAP and aspiration PNA with oral avelox daily.  Utilize aspiration precautions. No hypoxia or evidence of sepsis noted, can discharge to psych facility on oral course of avelox once more alert-- wouldn't hinder medical clearance.

## 2017-09-25 NOTE — ASSESSMENT & PLAN NOTE
Reportedly OD with Robaxin and heroin.  PEC in place.  Psych consultation. Poison control notified-supportive care indicated.  Once patient more alert, may be medically cleared for discharge to psych facility once accepted.

## 2017-09-26 VITALS
SYSTOLIC BLOOD PRESSURE: 115 MMHG | BODY MASS INDEX: 23.11 KG/M2 | WEIGHT: 189.81 LBS | OXYGEN SATURATION: 98 % | DIASTOLIC BLOOD PRESSURE: 63 MMHG | HEART RATE: 70 BPM | TEMPERATURE: 98 F | RESPIRATION RATE: 23 BRPM | HEIGHT: 76 IN

## 2017-09-26 PROCEDURE — G0378 HOSPITAL OBSERVATION PER HR: HCPCS

## 2017-09-26 PROCEDURE — 25000003 PHARM REV CODE 250: Performed by: NURSE PRACTITIONER

## 2017-09-26 PROCEDURE — S4991 NICOTINE PATCH NONLEGEND: HCPCS | Performed by: NURSE PRACTITIONER

## 2017-09-26 PROCEDURE — 25000003 PHARM REV CODE 250: Performed by: HOSPITALIST

## 2017-09-26 PROCEDURE — 25000003 PHARM REV CODE 250: Performed by: PSYCHIATRY & NEUROLOGY

## 2017-09-26 PROCEDURE — 94761 N-INVAS EAR/PLS OXIMETRY MLT: CPT

## 2017-09-26 PROCEDURE — 63600175 PHARM REV CODE 636 W HCPCS: Performed by: HOSPITALIST

## 2017-09-26 RX ORDER — CLONIDINE HYDROCHLORIDE 0.1 MG/1
TABLET ORAL
Status: DISCONTINUED
Start: 2017-09-26 | End: 2017-09-26 | Stop reason: HOSPADM

## 2017-09-26 RX ORDER — IBUPROFEN 200 MG
1 TABLET ORAL DAILY
Refills: 0 | COMMUNITY
Start: 2017-09-27 | End: 2019-08-09

## 2017-09-26 RX ORDER — GABAPENTIN 300 MG/1
CAPSULE ORAL
Status: DISCONTINUED
Start: 2017-09-26 | End: 2017-09-26 | Stop reason: WASHOUT

## 2017-09-26 RX ORDER — BUPRENORPHINE AND NALOXONE 8; 2 MG/1; MG/1
FILM, SOLUBLE BUCCAL; SUBLINGUAL
Status: DISCONTINUED
Start: 2017-09-26 | End: 2017-09-26 | Stop reason: HOSPADM

## 2017-09-26 RX ORDER — MOXIFLOXACIN HYDROCHLORIDE 400 MG/1
400 TABLET ORAL DAILY
Qty: 5 TABLET | Refills: 0 | Status: SHIPPED | OUTPATIENT
Start: 2017-09-27 | End: 2019-08-09

## 2017-09-26 RX ORDER — GABAPENTIN 300 MG/1
CAPSULE ORAL
Status: DISCONTINUED
Start: 2017-09-26 | End: 2017-09-26 | Stop reason: HOSPADM

## 2017-09-26 RX ADMIN — BUPRENORPHINE HYDROCHLORIDE, NALOXONE HYDROCHLORIDE 1 EACH: 8; 2 FILM, SOLUBLE BUCCAL; SUBLINGUAL at 08:09

## 2017-09-26 RX ADMIN — GABAPENTIN 600 MG: 300 CAPSULE ORAL at 07:09

## 2017-09-26 RX ADMIN — CLONIDINE HYDROCHLORIDE 0.1 MG: 0.1 TABLET ORAL at 02:09

## 2017-09-26 RX ADMIN — GABAPENTIN 600 MG: 300 CAPSULE ORAL at 02:09

## 2017-09-26 RX ADMIN — MOXIFLOXACIN HYDROCHLORIDE 400 MG: 400 TABLET, FILM COATED ORAL at 08:09

## 2017-09-26 RX ADMIN — NICOTINE 1 PATCH: 21 PATCH, EXTENDED RELEASE TRANSDERMAL at 08:09

## 2017-09-26 RX ADMIN — CLONIDINE HYDROCHLORIDE 0.1 MG: 0.1 TABLET ORAL at 07:09

## 2017-09-26 NOTE — PLAN OF CARE
09/26/17 1445   Final Note   Assessment Type Final Discharge Note   Discharge Disposition Psych  ([facility])   What phone number can be called within the next 1-3 days to see how you are doing after discharge? ([phone])   Discharge plans and expectations educations in teach back method with documentation complete? Yes

## 2017-09-26 NOTE — DISCHARGE SUMMARY
"[facility]  Discharge Summary      Patient Name: Srinivasa Bui  MRN: [id number]  Admission Date: 9/24/2017  Hospital Length of Stay: 2 days  Discharge Date and Time:  09/26/2017 2:28 PM  Attending Physician: Niru Ochoa MD   Discharging Provider: Niru Ochoa MD  Primary Care Provider: Primary Doctor No      HPI:   Srinivasa uBi is a 32 y.o. Male with PMHx significant for anxiety and opioid dependence, as well as IVDU and recently diagnosed Hep C.  He was admitted to the service of hospital medicine PEC'd after intentional OD.  He presented to the ED per EMS s/p intentional drug OD. Per EMS and pt's family members, the pt took x 50 Robaxin (dosage unknown)  at approximately 17:00 this evening. At arrival to scene EMS states that the pt was minimally responsive to painful stimuli with improvement in GCS s/p .5mg Narcan intranasal and 1.5 mg given IV. While in ED the pt admits to SI stating that he "was trying to hurt himself" after learning that his girlfriend slept with another man. No HI, delusions, auditory hallucinations, or visual hallucinations. At present, he admits to taking medication because he was angry but denies intent to self harm.  Reports history of SI with 2 previous inpatient psychiatric hospitalizations.  He reports he is being managed by [facility] for his heroin addiction and is receiving vivitrol injections.  He reports he injected heroin into his LEFT neck at about noon today, however he denies any "real high" because of the vivitrol.  Prior to that, he last used 1 week ago.  He denies sharing needles.  He endorses he was diagnosed with Hep C last week at [facility].  He also reports 2-3 days of cough with thick, brown sputum production.  Denies fever or chills.  No chest pain.  Other pertinent history as below:    * No surgery found *      Indwelling Lines/Drains at time of discharge:   Lines/Drains/Airways          No matching active lines, " drains, or airways        Hospital Course:   Pt admitted with PEC to ICU  due to overdose of robaxin and heroin.  He denied SI/HI ideations. He was initially groggy but quickly recovered. He was also noted to have bronchial pneumonia or atypical pna and treated with nebulizer treatments and moxifloxin po and symptoms improved.   Consult placed for psych and pt was CEC and transferred to inpatient psychiatric facility.   PE -ALERT nad  heent-nontraumatic, oral mmm   lungs clear   cor rrr   abd soft, bsx4,  ext -no c/c/e   neuro-Ox3, sensation intact   skin -w/d  Psych-cooperative and calm. Insight fair.        Consults:   Consults         Status Ordering Provider     Inpatient consult to Psychiatry  Once     Provider:  Phill Molina MD    Acknowledged RANDY STEVEN     Inpatient consult to Social Work/Case Management  Once     Provider:  (Not yet assigned)    Completed RANDY STEVEN          Significant Diagnostic Studies:   Results for MAREK KIRKLAND (MRN [id number]) as of 9/26/2017 14:28   Ref. Range 9/24/2017 19:04 9/25/2017 04:00   WBC Latest Ref Range: 3.90 - 12.70 K/uL 11.60 9.70   RBC Latest Ref Range: 4.60 - 6.20 M/uL 4.73 4.26 (L)   Hemoglobin Latest Ref Range: 14.0 - 18.0 g/dL 14.5 13.1 (L)   Hematocrit Latest Ref Range: 40.0 - 54.0 % 43.1 38.9 (L)   MCV Latest Ref Range: 82 - 98 fL 91 91   MCH Latest Ref Range: 27.0 - 31.0 pg 30.7 30.7   MCHC Latest Ref Range: 32.0 - 36.0 g/dL 33.7 33.6   RDW Latest Ref Range: 11.5 - 14.5 % 14.4 14.2   Platelets Latest Ref Range: 150 - 350 K/uL 239 223   Results for MAREK KIRKLAND (MRN [id number]) as of 9/26/2017 14:28   Ref. Range 9/25/2017 03:59   Sodium Latest Ref Range: 136 - 145 mmol/L 139   Potassium Latest Ref Range: 3.5 - 5.1 mmol/L 3.8   Chloride Latest Ref Range: 95 - 110 mmol/L 108   CO2 Latest Ref Range: 23 - 29 mmol/L 24   Anion Gap Latest Ref Range: 8 - 16 mmol/L 7 (L)   BUN, Bld Latest Ref Range: 6 - 20 mg/dL 4 (L)   Creatinine Latest Ref  Range: 0.5 - 1.4 mg/dL 0.7   eGFR if non African American Latest Ref Range: >60 mL/min/1.73 m^2 >60   eGFR if  Latest Ref Range: >60 mL/min/1.73 m^2 >60   Glucose Latest Ref Range: 70 - 110 mg/dL 119 (H)   Calcium Latest Ref Range: 8.7 - 10.5 mg/dL 8.7   Phosphorus Latest Ref Range: 2.7 - 4.5 mg/dL 2.6 (L)   Magnesium Latest Ref Range: 1.6 - 2.6 mg/dL 2.3   Alkaline Phosphatase Latest Ref Range: 55 - 135 U/L 59   Total Protein Latest Ref Range: 6.0 - 8.4 g/dL 6.1   Albumin Latest Ref Range: 3.5 - 5.2 g/dL 3.3 (L)   Total Bilirubin Latest Ref Range: 0.1 - 1.0 mg/dL 0.5   AST Latest Ref Range: 10 - 40 U/L 11   ALT Latest Ref Range: 10 - 44 U/L 10   Results for MAREK KIRKLAND (MRN [id number]) as of 9/26/2017 14:28   Ref. Range 9/24/2017 19:04   TSH Latest Ref Range: 0.400 - 4.000 uIU/mL 0.290 (L)   Free T4 Latest Ref Range: 0.71 - 1.51 ng/dL 1.31   Acetaminophen (Tylenol), Serum Latest Ref Range: 10.0 - 20.0 ug/mL <3.0 (L)       Pending Diagnostic Studies:     None        Final Active Diagnoses:    Diagnosis Date Noted POA    PRINCIPAL PROBLEM:  Intentional drug overdose [T50.902A] 09/24/2017 Yes    Hypokalemia [E87.6] 09/25/2017 Yes    Aspiration pneumonia [J69.0] 09/25/2017 Yes    Uncomplicated opioid dependence [F11.20] 09/25/2017 Yes    Hepatitis C virus infection without hepatic coma [B19.20] 09/25/2017 Yes    IVDU (intravenous drug user) [F19.90] 09/25/2017 Yes    Chronic pain syndrome [G89.4] 09/25/2017 Yes      Problems Resolved During this Admission:    Diagnosis Date Noted Date Resolved POA      No new Assessment & Plan notes have been filed under this hospital service since the last note was generated.  Service: Hospital Medicine      Discharged Condition: good    Disposition: Another Health Care Inst*    Follow Up:  Follow-up Information     [facility].    Specialties:  Psychiatry, Behavioral Health  Why:  Psych  Contact information:  [address] [facility] [address]  [facility] [address]  [phone]                 Patient Instructions:     Diet general     Activity as tolerated       Medications:  Reconciled Home Medications:   Current Discharge Medication List      START taking these medications    Details   moxifloxacin (AVELOX) 400 mg tablet Take 1 tablet (400 mg total) by mouth once daily.  Qty: 5 tablet, Refills: 0      nicotine (NICODERM CQ) 21 mg/24 hr Place 1 patch onto the skin once daily.  Refills: 0         CONTINUE these medications which have NOT CHANGED    Details   buprenorphine-naloxone 8-2 mg Film Place 1 tablet under the tongue 2 (two) times daily.      cyclobenzaprine (FLEXERIL) 5 MG tablet Take 5 mg by mouth 3 (three) times daily as needed for Muscle spasms.      gabapentin (NEURONTIN) 300 MG capsule Take 600 mg by mouth 3 (three) times daily.       hydrocodone-acetaminophen 7.5-325mg (NORCO) 7.5-325 mg per tablet Take 1 tablet by mouth every 6 (six) hours as needed for Pain.           Time spent on the discharge of patient: 35 minutes      Niru Ochoa MD  Department of Hospital Medicine  [facility]

## 2017-09-26 NOTE — PROGRESS NOTES
Updated pt on the placement.      2:35 p.m. - spoke to Rozina at [facility], [phone] regarding [facility].  Updated pt's nurse Marimar. Faxed pt's discharge order and [facility]S to [facility].

## 2017-09-26 NOTE — PLAN OF CARE
Problem: Restraint, Nonbehavioral (nonviolent)  Goal: Discontinuation Criteria Achieved  Removed restraints and explained to patient that any further attempts of eloping would result in having restraints put back on.

## 2017-09-26 NOTE — PROGRESS NOTES
Mother , Oly , called and presented password; explains that when pt took her purse last night that his cell phone was in her purse and is now not there; Oly was concerned that the pt has the cell phone;

## 2017-09-26 NOTE — HOSPITAL COURSE
Pt admitted with PEC to ICU  due to overdose of robaxin and heroin.  He denied SI/HI ideations. He was initially groggy but quickly recovered. He was also noted to have bronchial pneumonia or atypical pna and treated with nebulizer treatments and moxifloxin po and symptoms improved.   Consult placed for psych and pt was CEC and transferred to inpatient psychiatric facility.   PE -ALERT nad  heent-nontraumatic, oral mmm   lungs clear   cor rrr   abd soft, bsx4,  ext -no c/c/e   neuro-Ox3, sensation intact   skin -w/d  Psych-cooperative and calm. Insight fair.

## 2017-09-26 NOTE — PROGRESS NOTES
Lyudmila at [facility] stated they do not do suboxone and would not be able to admit him unless he wanted to detox from suboxone.  Updated Dr. Ochoa.

## 2017-09-26 NOTE — PROGRESS NOTES
Alexis at [facility], [phone] verified that pt had hx of admission at their facility.  He stated they would know if they had discharges in about an hour.      10:00 a.m. - met with pt to update him that I am calling for placement.  Pt verified that he had been at [facility] approximately two weeks ago.  Pt is requesting that I call his mother requesting she bring clothes to the ICU for him.  Faxed [facility], [phone], pt's chart information including the PEC/CEC and psych consult.     Shabbir at [facility], [phone] requested a packet be faxed to her.  Hard faxed he packet, [phone].    10:30 a.m. - hard faxed Lyudmila at [facility] in [place], (phone [phone]) pt's packet.       11:00 a.m. - britney at [facility], [phone] reports no beds at this time however may have discharges later today.  Faxed [phone] her a packet for review.    Left message on Obie, intake voice mail at [facility],[phone] and faxed [phone] a packet of info for review.

## 2017-09-26 NOTE — NURSING
Patient wanted to walk down almaraz to use restroom for bowel movement. Explained that due to earlier incident of running out of the hospital that he would have to use a bedside commode in the room. Patient was not very happy about this and did some cursing but did eventually use the bedside commode with security noted outside of room.

## 2017-09-26 NOTE — PROGRESS NOTES
[facility] at [facility], [phone] stated Dr. Infante has accepted pt for admission.  She stated he will go to 4th floor and okay to call report to [phone] after 2:30 p.m.  Updated Dr. Ochoa, pt's nurse Marimar.    2:05 p.m. - left message of pt's mothers voice mail, [phone] to update her and provide her with address and phone number of [facility].

## 2017-09-26 NOTE — SIGNIFICANT EVENT
Stepan walked patient to almaraz restroom and upon reentrance to hospital room patient grabbed Mother's purse and ran out the hospital. Code Halley was called and patient was apprehended in the hospital parking lot. Patient was brought back to room,Nilsa King NP was notified, and soft wrist restraints were applied.

## 2017-09-26 NOTE — CONSULTS
"HISTORY OF PRESENT ILLNESS:  The patient is a 32-year-old white male whose   mailing address is [address] [place] [address], [id number].  His residential   telephone number is [phone] patient is admitted to Int[facility] of   [facility] after the patient was brought to the Emergency Room.  The   history present in the record says that he had taken an intentional overdose   that he took 50 Robaxin pills at about 1700 yesterday evening and having had a   fight with his "ex," he took he said Robaxin pills prior to that, he said he   injected heroin in his left neck.  The patient says that it was 1 bag of heroin,   which comes down to about 40 mL that he injected and thereafter having an   argument with his ex took "20"  Robaxin pills and he had no idea that these   muscle relaxants, which are nonnarcotic, would do this to him.  The patient says   that in no way was this suicide attempt, that he is an addict and has been in   recovery.  He sleeps well, has good appetite and weight is stable.  Denies any   crying spells.  Denies any major mood swings, temper flare-ups, any anxiety or   panic attacks.  No issues with memory that he has a stable work and that if he   knew this was what was going to happen, he would have been more careful that he   would not have taken the overdose in presence of his mother, reportedly mother   called the ambulance and the patient was brought to the ER.    PAST PSYCHIATRIC HISTORY:  He denies any suicide attempt in the past.  Admits to   having been to [facility] for his detox in [place] and that they gave   him a shot of Limbitrol and they gave him a prescription for Robaxin.  He says   that he also takes Neurontin and nothing else besides the stated medications.    FAMILY HISTORY:  Positive.  He said something about brother, but did not want to   go into details.    SOCIAL HISTORY:  The patient's father has passed on.  Mother is in her early   50s.  He has one " brother, no sisters.  Has an 8th grade education, but then had   GED.  The reason he says he left 8th grade was because he wanted to have GED.    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  Denies any alcohol use.    Does smoke.    PAST MEDICAL HISTORY:  Denies any drug abuse.  His urine toxicology was   presumptively positive for opioids and barbiturates.    ALLERGIES:  None known.    MEDICAL AND PHYSICAL PROBLEMS:  Complains of back pain status post breast   surgery, status post some back injury.    MEDICATIONS AT HOME:  Flexeril, Neurontin, hydrocodone 7.5/325 one tablet every   6 hours, Neurontin 300 mg three times a day.    MENTAL STATUS EXAMINATION:  This is a 32-year-old white male, lying in bed, gets   angry and agitated very quickly, tries his best to hold his anger, but it is   pretty obvious that it will break out into agitation at any time.  The patient   is alert, inattentive, cooperative, only to a point, is oriented to place,   person and time.  Mood is irritated.  Affect is expansive.  He denies any   auditory or visual hallucinations.  Denies any homicide or suicide intent.  The   patient's impulse control is markedly poor, has no insight and judgment is   markedly impaired.    IMPRESSION:  AXIS I:  1.  Probable bipolar disorder mixed-in type.  2.  Opioid dependency, benzodiazepine dependency and major depression,   recurrent, moderate.  AXIS II:  Nothing diagnosed.  AXIS III:  Status post breast surgery.  AXIS IV:  Health social.  AXIS V:  About 35.    RECOMMENDATIONS AND PLAN:  The patient's reliability is highly questionable.  He   minimizes the anger and rage that he does have and he seems to be a danger to   self and others and a PEC been in charts, requires seriously for him to be   transferred to appropriate psychiatric facility.      BM/IN  dd: 09/25/2017 14:42:15 (CDT)  td: 09/25/2017 22:09:32 (CDT)  Doc ID   #[id number]  Job ID #[id number]    CC:

## 2017-09-26 NOTE — PLAN OF CARE
Problem: Patient Care Overview  Goal: Plan of Care Review  Outcome: Revised  Medically cleared for psych placement. CEC in place with sitter at bedside and at HIGH risk for eloping due to attempt earlier this shift.

## 2017-09-26 NOTE — PLAN OF CARE
09/25/17 1910   Patient Assessment/Suction   Level of Consciousness (AVPU) alert   Respiratory Effort Normal;Unlabored   PRE-TX-O2-ETCO2   O2 Device (Oxygen Therapy) room air   SpO2 98 %   Pulse 70   Resp 18   Pt tolerates RA well.

## 2018-07-17 NOTE — PLAN OF CARE
09/26/17 0832   Patient Assessment/Suction   Level of Consciousness (AVPU) alert   Respiratory Effort Normal;Unlabored   Expansion/Accessory Muscles/Retractions no retractions;no use of accessory muscles   PRE-TX-O2-ETCO2   O2 Device (Oxygen Therapy) room air   SpO2 98 %   Pulse Oximetry Type Intermittent   $ Pulse Oximetry - Multiple Charge Pulse Oximetry - Multiple   Pulse 70   Resp (!) 23       Patient sleeping with no respiratory distress noted.    No

## 2019-06-13 ENCOUNTER — HOSPITAL ENCOUNTER (OUTPATIENT)
Dept: TELEMEDICINE | Facility: HOSPITAL | Age: 34
Discharge: HOME | End: 2019-06-13
Payer: MEDICAID

## 2019-06-13 PROCEDURE — 99282 EMERGENCY DEPT VISIT SF MDM: CPT | Mod: GT,SA,HB,S$PBB | Performed by: NURSE PRACTITIONER

## 2019-06-13 PROCEDURE — 99282 PR EMERGENCY DEPT VISIT,LEVEL II: ICD-10-PCS | Mod: GT,SA,HB,S$PBB | Performed by: NURSE PRACTITIONER

## 2019-06-14 NOTE — CONSULTS
[facility]  Psychiatry  Telepsychiatry Consult Note    Please see previous notes: n/a    Patient agreeable to consultation via telepsychiatry.    Tele-Consultation from Psychiatry started: 6/13/2019 at 19:45 CST  The chief complaint leading to psychiatric consultation is: substance use disorder  This consultation was requested by , the Emergency Department attending physician.  The location of the consulting psychiatrist is [place], [place].  The patient location is [facility]   The patient arrived at the ED on 6/13/19    Also present with the patient at the time of the consultation: ER staff    Patient Identification:   Srinivasa Bui is a 34 y.o. male.    Patient information was obtained from patient and relative(s).  Patient presented voluntarily to the Emergency Department by private vehicle.    Consults  Subjective:   History of Present Illness:  Patient has a history of depression and opioid use disorder. He denies current suicidal ideation, and states that he is currently voluntarily seeking detox and treatment for opioid use disorder. Mother states that patient has verbalized active suicidal ideation over the last few days; patient may be minimizing depression symptoms to avoid involuntary committment. Patient is calm, cooperative, and well-related on exam. He denies history of psychiatric treatment for depression, but has completed detox and rehab programs for opioid addiction in the past.     Psychiatric History:   Previous Psychiatric Hospitalizations: previous detox/rehab for opioid use disorder  Previous Medication Trials: denies  Previous Suicide Attempts: denies  History of Violence: denies  History of Depression: yes  History of [place]: denies  History of Auditory/Visual Hallucination denies  History of Delusions: unknown  Outpatient psychiatrist (current & past): unknown    Substance Abuse History:  Tobacco: cigarettes, a pack and a half  "a day  Alcohol: No  Illicit Substances: yes, heroin  Detox/Rehab: Yes    Legal History: Past charges/incarcerations: unknown     Family Psychiatric History: two brothers and father all with opioid use disorder. Father  of a heroin overdose. Brother with depression.     Social History:  Developmental/Childhood:unknown  *Education:GED  Employment Status/Finances:unknown   Relationship Status/Sexual Orientation: in a 5-year relationship with a woman; per mother, pt's girlfriend does not use substances  Children: 0  Housing Status: at home with girlfriend    history:  NO  Access to gun: NO  Hinduism:unknown  Recreational activities:time with girlfriend    Psychiatric Mental Status Exam:  Arousal: alert  Sensorium/Orientation: oriented to grossly intact, person, place, situation, time/date, day of week, month of year, year  Behavior/Cooperation: normal, cooperative   Speech: normal tone, normal rate, normal pitch, normal volume  Language: grossly intact  Mood: " okay "   Affect: appropriate  Thought Process: normal and logical  Thought Content:   Auditory hallucinations: NO  Visual hallucinations: NO  Paranoia: NO  Delusions:  NO  Suicidal ideation: patient denies; mother reports pt has verbalized suicidal ideation  Homicidal ideation: NO  Attention/Concentration:  intact  Memory:    Recent:  Intact   Remote: Intact  Fund of Knowledge: Intact   Abstract reasoning: proverbs were abstract  Insight: has awareness of illness  Judgment: behavior is adequate to circumstances, age appropriate      Past Medical History:   Past Medical History:   Diagnosis Date    Anxiety     depression    Breast abscess in male     right    Opioid dependence       Laboratory Data: Labs Reviewed - No data to display    Neurological History:  Seizures: No  Head trauma: unknown    Allergies:   Review of patient's allergies indicates:  No Known Allergies    Medications in ER: Medications - No data to display    Medications at home: " none    Subjective & objective note cannot be loaded without a specified hospital service.      Assessment - Diagnosis - Goals:     Diagnosis/Impression: unspecified depressive disorder, opioid use disorder. The patient is voluntarily seeking treatment for opioid use disorder; he denies suicidal ideation, but admits to history of depression. Pt's mother states that patient has verbalized suicidal ideation in the past few days. I believe the patient is currently a danger to himself, and may be minimizing symptoms of suicidal ideation on exam to avoid involuntary commitment. Spoke with Dr. Clinton, who agreed pt is a danger to himself.     Rec:   - medical clearance  - PEC and transfer to psychiatric facility    Time with patient: 30 minutes      More than 50% of the time was spent counseling/coordinating care    Consulting clinician was informed of the encounter and consult note.    Consultation ended: 6/13/2019 at 20:22 CST    Malia Gray NP   Psychiatry  [facility]

## 2019-08-09 ENCOUNTER — HOSPITAL ENCOUNTER (EMERGENCY)
Facility: HOSPITAL | Age: 34
Discharge: HOME | End: 2019-08-10
Attending: EMERGENCY MEDICINE
Payer: MEDICAID

## 2019-08-09 DIAGNOSIS — S22.31XD CLOSED FRACTURE OF ONE RIB OF RIGHT SIDE WITH ROUTINE HEALING, SUBSEQUENT ENCOUNTER: Primary | ICD-10-CM

## 2019-08-09 PROCEDURE — 99283 EMERGENCY DEPT VISIT LOW MDM: CPT

## 2019-08-09 PROCEDURE — 99900035 HC TECH TIME PER 15 MIN (STAT)

## 2019-08-09 PROCEDURE — 25000003 PHARM REV CODE 250: Performed by: NURSE PRACTITIONER

## 2019-08-09 PROCEDURE — 94799 UNLISTED PULMONARY SVC/PX: CPT

## 2019-08-09 RX ORDER — HYDROCODONE BITARTRATE AND ACETAMINOPHEN 10; 325 MG/1; MG/1
1 TABLET ORAL
Status: COMPLETED | OUTPATIENT
Start: 2019-08-09 | End: 2019-08-09

## 2019-08-09 RX ADMIN — HYDROCODONE BITARTRATE AND ACETAMINOPHEN 1 TABLET: 10; 325 TABLET ORAL at 11:08

## 2019-08-10 VITALS
RESPIRATION RATE: 16 BRPM | HEIGHT: 76 IN | SYSTOLIC BLOOD PRESSURE: 135 MMHG | WEIGHT: 200 LBS | HEART RATE: 88 BPM | DIASTOLIC BLOOD PRESSURE: 68 MMHG | TEMPERATURE: 100 F | OXYGEN SATURATION: 97 % | BODY MASS INDEX: 24.36 KG/M2

## 2019-08-10 RX ORDER — HYDROCODONE BITARTRATE AND ACETAMINOPHEN 5; 325 MG/1; MG/1
1 TABLET ORAL EVERY 6 HOURS PRN
Qty: 20 TABLET | Refills: 0 | OUTPATIENT
Start: 2019-08-10 | End: 2020-12-19

## 2019-08-10 NOTE — CARE UPDATE
08/09/19 3106   Patient Assessment/Suction   Level of Consciousness (AVPU) alert   Respiratory Effort Shallow   Expansion/Accessory Muscles/Retractions no use of accessory muscles   All Lung Fields Breath Sounds diminished;wheezes, expiratory   Rhythm/Pattern, Respiratory shallow   PRE-TX-O2   O2 Device (Oxygen Therapy) room air   Pulse 107   Resp 18   Breath Sounds Post-Respiratory Treatment   Throughout All Fields Post-Treatment All Fields   Throughout All Fields Post-Treatment no change   Incentive Spirometer   $ Incentive Spirometer Charges done with encouragement;proper technique demonstrated;ready for self-administration   Incentive Spirometer Predicted Level (mL) 2750   Administration (IS) instruction provided, initial;proper technique demonstrated   Number of Repetitions (IS) 20   Level Incentive Spirometer (mL) 3000   Patient Tolerance (IS) good;no adverse signs/symptoms present   Instructed deep diaphragmatic breathing technique with IS

## 2019-08-10 NOTE — ED NOTES
Pt states he was in [facility]. Pain was 2 at rest and 5/10 when breaths. Now pain is a constant 5. Was told to take tylenol at home.
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
## 2019-09-18 ENCOUNTER — HOSPITAL ENCOUNTER (EMERGENCY)
Facility: HOSPITAL | Age: 34
Discharge: HOME | End: 2019-09-19
Attending: EMERGENCY MEDICINE
Payer: MEDICAID

## 2019-09-18 DIAGNOSIS — G44.209 TENSION HEADACHE: Primary | ICD-10-CM

## 2019-09-18 PROCEDURE — 85025 COMPLETE CBC W/AUTO DIFF WBC: CPT

## 2019-09-18 PROCEDURE — 96361 HYDRATE IV INFUSION ADD-ON: CPT

## 2019-09-18 PROCEDURE — 96375 TX/PRO/DX INJ NEW DRUG ADDON: CPT

## 2019-09-18 PROCEDURE — 63600175 PHARM REV CODE 636 W HCPCS: Performed by: EMERGENCY MEDICINE

## 2019-09-18 PROCEDURE — 80053 COMPREHEN METABOLIC PANEL: CPT

## 2019-09-18 PROCEDURE — 99284 EMERGENCY DEPT VISIT MOD MDM: CPT | Mod: 25

## 2019-09-18 PROCEDURE — 96374 THER/PROPH/DIAG INJ IV PUSH: CPT

## 2019-09-18 RX ORDER — PROCHLORPERAZINE EDISYLATE 5 MG/ML
10 INJECTION INTRAMUSCULAR; INTRAVENOUS
Status: COMPLETED | OUTPATIENT
Start: 2019-09-18 | End: 2019-09-19

## 2019-09-18 RX ORDER — DIPHENHYDRAMINE HYDROCHLORIDE 50 MG/ML
25 INJECTION INTRAMUSCULAR; INTRAVENOUS
Status: COMPLETED | OUTPATIENT
Start: 2019-09-18 | End: 2019-09-19

## 2019-09-18 RX ADMIN — SODIUM CHLORIDE 1000 ML: 0.9 INJECTION, SOLUTION INTRAVENOUS at 11:09

## 2019-09-19 VITALS
HEART RATE: 61 BPM | OXYGEN SATURATION: 92 % | SYSTOLIC BLOOD PRESSURE: 92 MMHG | BODY MASS INDEX: 24.36 KG/M2 | WEIGHT: 200 LBS | DIASTOLIC BLOOD PRESSURE: 55 MMHG | RESPIRATION RATE: 8 BRPM | TEMPERATURE: 98 F | HEIGHT: 76 IN

## 2019-09-19 LAB
ALBUMIN SERPL BCP-MCNC: 3.8 G/DL (ref 3.5–5.2)
ALP SERPL-CCNC: 62 U/L (ref 55–135)
ALT SERPL W/O P-5'-P-CCNC: 15 U/L (ref 10–44)
ANION GAP SERPL CALC-SCNC: 9 MMOL/L (ref 8–16)
AST SERPL-CCNC: 12 U/L (ref 10–40)
BASOPHILS # BLD AUTO: 0.03 K/UL (ref 0–0.2)
BASOPHILS NFR BLD: 0.4 % (ref 0–1.9)
BILIRUB SERPL-MCNC: 0.6 MG/DL (ref 0.1–1)
BUN SERPL-MCNC: 9 MG/DL (ref 6–20)
CALCIUM SERPL-MCNC: 8.4 MG/DL (ref 8.7–10.5)
CHLORIDE SERPL-SCNC: 103 MMOL/L (ref 95–110)
CO2 SERPL-SCNC: 25 MMOL/L (ref 23–29)
CREAT SERPL-MCNC: 0.7 MG/DL (ref 0.5–1.4)
DIFFERENTIAL METHOD: NORMAL
EOSINOPHIL # BLD AUTO: 0.2 K/UL (ref 0–0.5)
EOSINOPHIL NFR BLD: 2 % (ref 0–8)
ERYTHROCYTE [DISTWIDTH] IN BLOOD BY AUTOMATED COUNT: 12.6 % (ref 11.5–14.5)
EST. GFR  (AFRICAN AMERICAN): >60 ML/MIN/1.73 M^2
EST. GFR  (NON AFRICAN AMERICAN): >60 ML/MIN/1.73 M^2
GLUCOSE SERPL-MCNC: 93 MG/DL (ref 70–110)
HCT VFR BLD AUTO: 43.2 % (ref 40–54)
HGB BLD-MCNC: 14.1 G/DL (ref 14–18)
IMM GRANULOCYTES # BLD AUTO: 0.02 K/UL (ref 0–0.04)
IMM GRANULOCYTES NFR BLD AUTO: 0.2 % (ref 0–0.5)
LYMPHOCYTES # BLD AUTO: 2 K/UL (ref 1–4.8)
LYMPHOCYTES NFR BLD: 24.3 % (ref 18–48)
MCH RBC QN AUTO: 30.3 PG (ref 27–31)
MCHC RBC AUTO-ENTMCNC: 32.6 G/DL (ref 32–36)
MCV RBC AUTO: 93 FL (ref 82–98)
MONOCYTES # BLD AUTO: 0.5 K/UL (ref 0.3–1)
MONOCYTES NFR BLD: 6.5 % (ref 4–15)
NEUTROPHILS # BLD AUTO: 5.4 K/UL (ref 1.8–7.7)
NEUTROPHILS NFR BLD: 66.6 % (ref 38–73)
NRBC BLD-RTO: 0 /100 WBC
PLATELET # BLD AUTO: 205 K/UL (ref 150–350)
PMV BLD AUTO: 10.7 FL (ref 9.2–12.9)
POTASSIUM SERPL-SCNC: 3.7 MMOL/L (ref 3.5–5.1)
PROT SERPL-MCNC: 7.1 G/DL (ref 6–8.4)
RBC # BLD AUTO: 4.66 M/UL (ref 4.6–6.2)
SODIUM SERPL-SCNC: 137 MMOL/L (ref 136–145)
WBC # BLD AUTO: 8.16 K/UL (ref 3.9–12.7)

## 2019-09-19 PROCEDURE — 63600175 PHARM REV CODE 636 W HCPCS: Performed by: EMERGENCY MEDICINE

## 2019-09-19 RX ORDER — KETOROLAC TROMETHAMINE 30 MG/ML
30 INJECTION, SOLUTION INTRAMUSCULAR; INTRAVENOUS
Status: COMPLETED | OUTPATIENT
Start: 2019-09-19 | End: 2019-09-19

## 2019-09-19 RX ORDER — METOCLOPRAMIDE HYDROCHLORIDE 5 MG/ML
10 INJECTION INTRAMUSCULAR; INTRAVENOUS
Status: COMPLETED | OUTPATIENT
Start: 2019-09-19 | End: 2019-09-19

## 2019-09-19 RX ADMIN — KETOROLAC TROMETHAMINE 30 MG: 30 INJECTION, SOLUTION INTRAMUSCULAR at 01:09

## 2019-09-19 RX ADMIN — PROCHLORPERAZINE EDISYLATE 10 MG: 5 INJECTION INTRAMUSCULAR; INTRAVENOUS at 12:09

## 2019-09-19 RX ADMIN — METOCLOPRAMIDE 10 MG: 5 INJECTION, SOLUTION INTRAMUSCULAR; INTRAVENOUS at 01:09

## 2019-09-19 RX ADMIN — DIPHENHYDRAMINE HYDROCHLORIDE 25 MG: 50 INJECTION INTRAMUSCULAR; INTRAVENOUS at 12:09

## 2019-09-19 NOTE — ED NOTES
"Pt here with c/o pressure HA x 2 days with nausea at times. Denies any vision changes but states that at around 3p yesterday the L side of his face was numb but reports now it just feels "weird" and "decreased sensation". Denies any numbness/weakness/tingling any where else. Denies CP, SOB, and fever/chills. Endorses photophobia and states noises make his headache worse. Placed pt in gown and on monitors. Bed locked and in low position. Side rails up x2. Pending further ED workup. Will continue to monitor   "

## 2019-09-19 NOTE — ED PROVIDER NOTES
Encounter Date: 9/18/2019       History     Chief Complaint   Patient presents with    Headache    Nausea     34-year-old male presents to the ED with complaints of headache that began several hours prior to arrival.  The headache has a 10/10 intensity, nonradiating located in the bilateral temporal regions, throbbing in character, exacerbated with light/loud noises and minimally relieved with rest.  The patient denies any previous episodes of headache similar to his presentation today, chest pain, shortness of breath, nausea, vomiting, diarrhea, recent illnesses or recent use of IVDU.        Review of patient's allergies indicates:  No Known Allergies  Past Medical History:   Diagnosis Date    Anxiety     depression    Breast abscess in male     right    Opioid dependence      Past Surgical History:   Procedure Laterality Date    BREAST SURGERY      I & D right    INCISION AND DRAINAGE (I&D), BREAST Right 12/12/2014    Performed by Yarelis Hinkle MD at [facility] OR     Family History   Problem Relation Age of Onset    Diabetes Father     Heart disease Father      Social History     Tobacco Use    Smoking status: Current Every Day Smoker     Packs/day: 2.00     Years: 15.00     Pack years: 30.00    Smokeless tobacco: Never Used   Substance Use Topics    Alcohol use: Yes     Comment: occasional    Drug use: Yes     Types: Heroin     Review of Systems   Constitutional: Negative for activity change and appetite change.   HENT: Negative for facial swelling.    Eyes: Negative for pain.   Respiratory: Negative for shortness of breath.    Cardiovascular: Negative for chest pain.   Gastrointestinal: Negative for abdominal pain.   Genitourinary: Negative for dysuria.   Musculoskeletal: Negative for arthralgias and back pain.   Skin: Negative for color change.   Neurological: Positive for headaches. Negative for dizziness and numbness.   Psychiatric/Behavioral: Negative for confusion.   All other systems reviewed and  are negative.      Physical Exam     Initial Vitals [09/18/19 2209]   BP Pulse Resp Temp SpO2   135/74 74 18 98.4 °F (36.9 °C) 99 %      MAP       --         Physical Exam    Nursing note and vitals reviewed.  Constitutional: Vital signs are normal. He appears well-developed and well-nourished.   HENT:   Head: Normocephalic and atraumatic.   Eyes: Conjunctivae, EOM and lids are normal. Pupils are equal, round, and reactive to light.   Neck: Normal range of motion. Neck supple.   Cardiovascular: Normal rate, regular rhythm, S1 normal, S2 normal, normal heart sounds, intact distal pulses and normal pulses.   Abdominal: Soft. Normal appearance and bowel sounds are normal.   Musculoskeletal: Normal range of motion.   Neurological: He is alert and oriented to person, place, and time. No cranial nerve deficit or sensory deficit.   Motor, strength, sensation and cerebellar function intact   Skin: Skin is warm and dry. Capillary refill takes less than 2 seconds.   Psychiatric: He has a normal mood and affect. His speech is normal and behavior is normal. Judgment normal.           Srinivasa Bui presents with clinical signs and symptoms that correlate but is not limited to tension headache, migraine, subarachnoid hemorrhage, cluster headache     I performed a detailed H&P,ordered basic labs, CT head without contrast, IV fluids, Compazine, Toradol, Benadryl and place the patient on cardiac monitor.  CT results were negative for any acute findings and the patient is now resting comfortably in the examination. He was informed of his results and will be discharged at this time.  Vital signs stable and he remains a and O times without any focal deficits.  Patricia Wiggins M.D  Emergency Medicine PGY-4  3:10 AM       ED Course   Procedures  Labs Reviewed   COMPREHENSIVE METABOLIC PANEL - Abnormal; Notable for the following components:       Result Value    Calcium 8.4 (*)     All other components within normal limits    CBC W/ AUTO DIFFERENTIAL          Imaging Results          CT Head Without Contrast (Final result)  Result time 09/19/19 00:15:00    Final result by Jose Mcpherson MD (09/19/19 00:15:00)                 Narrative:        Exam: CT OF THE BRAIN WITHOUT CONTRAST    Clinical data: Headache, chronic, new features of neuro defect.    Technique: Contiguous axial images are obtained from the skull base to vertex  without intravenous contrast. Radiation dose: CTDIvol = 52.3 mGy, DLP = 955.4  mGy x cm.    Prior studies: No prior studies submitted.    Findings:    No acute intracranial abnormality is present. No evidence of acute cortical  infarction, hemorrhage, mass or mass effect. No hydrocephalus or abnormal  extra-axial fluid collections are present. The posterior fossa is unremarkable.    The skull base and calvarium are intact. The included portions of the paranasal  sinuses and mastoid air cells are clear.    IMPRESSION: Unremarkable noncontrast CT brain study.    Recommendation: Follow up as clinically indicated.    All CT scans at this facility utilize dose modulation, iterative reconstruction,  and/or weight based dosing when appropriate to reduce radiation dose to as low  as reasonably achievable.      Electronically Signed by JOSE MCPHERSON MD at 9/19/2019 2:34:00 AM                                                    Clinical Impression:       ICD-10-CM ICD-9-CM   1. Tension headache G44.209 307.81                                Patricia Wiggins MD  Resident  09/19/19 0316

## 2020-12-19 ENCOUNTER — HOSPITAL ENCOUNTER (EMERGENCY)
Facility: HOSPITAL | Age: 35
Discharge: HOME | End: 2020-12-19
Attending: EMERGENCY MEDICINE
Payer: MEDICAID

## 2020-12-19 VITALS
BODY MASS INDEX: 27.4 KG/M2 | DIASTOLIC BLOOD PRESSURE: 77 MMHG | HEIGHT: 76 IN | OXYGEN SATURATION: 99 % | SYSTOLIC BLOOD PRESSURE: 144 MMHG | HEART RATE: 88 BPM | RESPIRATION RATE: 18 BRPM | WEIGHT: 225 LBS | TEMPERATURE: 98 F

## 2020-12-19 DIAGNOSIS — S69.90XA WRIST INJURY: ICD-10-CM

## 2020-12-19 DIAGNOSIS — S62.91XA CLOSED FRACTURE OF RIGHT HAND, INITIAL ENCOUNTER: Primary | ICD-10-CM

## 2020-12-19 PROCEDURE — 99283 EMERGENCY DEPT VISIT LOW MDM: CPT | Mod: 25

## 2020-12-19 PROCEDURE — 29125 APPL SHORT ARM SPLINT STATIC: CPT

## 2020-12-19 RX ORDER — HYDROCODONE BITARTRATE AND ACETAMINOPHEN 5; 325 MG/1; MG/1
1 TABLET ORAL EVERY 6 HOURS PRN
Qty: 10 TABLET | Refills: 0 | Status: SHIPPED | OUTPATIENT
Start: 2020-12-19 | End: 2022-11-14
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
## 2021-01-09 ENCOUNTER — HOSPITAL ENCOUNTER (OUTPATIENT)
Facility: HOSPITAL | Age: 36
Discharge: HOME | End: 2021-01-10
Attending: INTERNAL MEDICINE | Admitting: FAMILY MEDICINE
Payer: MEDICAID

## 2021-01-09 DIAGNOSIS — S02.0XXA: ICD-10-CM

## 2021-01-09 DIAGNOSIS — S02.0XXA CLOSED FRACTURE OF FRONTAL BONE, INITIAL ENCOUNTER: ICD-10-CM

## 2021-01-09 DIAGNOSIS — S06.4XAA EPIDURAL HEMATOMA: Primary | ICD-10-CM

## 2021-01-09 PROBLEM — F11.29 OPIOID DEPENDENCE WITH OPIOID-INDUCED DISORDER: Status: ACTIVE | Noted: 2017-09-25

## 2021-01-09 PROBLEM — R56.9 SEIZURE: Status: ACTIVE | Noted: 2021-01-09

## 2021-01-09 PROBLEM — S62.91XP: Status: ACTIVE | Noted: 2021-01-09

## 2021-01-09 LAB
ALBUMIN SERPL BCP-MCNC: 4.6 G/DL (ref 3.5–5.2)
ALP SERPL-CCNC: 72 U/L (ref 55–135)
ALT SERPL W/O P-5'-P-CCNC: 14 U/L (ref 10–44)
ANION GAP SERPL CALC-SCNC: 15 MMOL/L (ref 8–16)
AST SERPL-CCNC: 14 U/L (ref 10–40)
BASOPHILS # BLD AUTO: 0.02 K/UL (ref 0–0.2)
BASOPHILS NFR BLD: 0.2 % (ref 0–1.9)
BILIRUB SERPL-MCNC: 0.6 MG/DL (ref 0.1–1)
BUN SERPL-MCNC: 11 MG/DL (ref 6–20)
CALCIUM SERPL-MCNC: 9.7 MG/DL (ref 8.7–10.5)
CHLORIDE SERPL-SCNC: 107 MMOL/L (ref 95–110)
CO2 SERPL-SCNC: 20 MMOL/L (ref 23–29)
CREAT SERPL-MCNC: 0.9 MG/DL (ref 0.5–1.4)
DIFFERENTIAL METHOD: ABNORMAL
EOSINOPHIL # BLD AUTO: 0 K/UL (ref 0–0.5)
EOSINOPHIL NFR BLD: 0 % (ref 0–8)
ERYTHROCYTE [DISTWIDTH] IN BLOOD BY AUTOMATED COUNT: 13.7 % (ref 11.5–14.5)
EST. GFR  (AFRICAN AMERICAN): >60 ML/MIN/1.73 M^2
EST. GFR  (NON AFRICAN AMERICAN): >60 ML/MIN/1.73 M^2
GLUCOSE SERPL-MCNC: 94 MG/DL (ref 70–110)
HCT VFR BLD AUTO: 44.6 % (ref 40–54)
HGB BLD-MCNC: 14.8 G/DL (ref 14–18)
IMM GRANULOCYTES # BLD AUTO: 0.06 K/UL (ref 0–0.04)
IMM GRANULOCYTES NFR BLD AUTO: 0.7 % (ref 0–0.5)
LYMPHOCYTES # BLD AUTO: 1.7 K/UL (ref 1–4.8)
LYMPHOCYTES NFR BLD: 18.7 % (ref 18–48)
MCH RBC QN AUTO: 29.5 PG (ref 27–31)
MCHC RBC AUTO-ENTMCNC: 33.2 G/DL (ref 32–36)
MCV RBC AUTO: 89 FL (ref 82–98)
MONOCYTES # BLD AUTO: 0.4 K/UL (ref 0.3–1)
MONOCYTES NFR BLD: 4.6 % (ref 4–15)
NEUTROPHILS # BLD AUTO: 6.8 K/UL (ref 1.8–7.7)
NEUTROPHILS NFR BLD: 75.8 % (ref 38–73)
NRBC BLD-RTO: 0 /100 WBC
PLATELET # BLD AUTO: 253 K/UL (ref 150–350)
PMV BLD AUTO: 10.4 FL (ref 9.2–12.9)
POTASSIUM SERPL-SCNC: 4.7 MMOL/L (ref 3.5–5.1)
PROT SERPL-MCNC: 8.5 G/DL (ref 6–8.4)
RBC # BLD AUTO: 5.02 M/UL (ref 4.6–6.2)
SARS-COV-2 RDRP RESP QL NAA+PROBE: NEGATIVE
SODIUM SERPL-SCNC: 142 MMOL/L (ref 136–145)
WBC # BLD AUTO: 8.92 K/UL (ref 3.9–12.7)

## 2021-01-09 PROCEDURE — 29125 APPL SHORT ARM SPLINT STATIC: CPT | Mod: RT

## 2021-01-09 PROCEDURE — G0378 HOSPITAL OBSERVATION PER HR: HCPCS

## 2021-01-09 PROCEDURE — 96365 THER/PROPH/DIAG IV INF INIT: CPT

## 2021-01-09 PROCEDURE — 36415 COLL VENOUS BLD VENIPUNCTURE: CPT

## 2021-01-09 PROCEDURE — 99220 PR INITIAL OBSERVATION CARE,LEVL III: ICD-10-PCS | Mod: ,,, | Performed by: FAMILY MEDICINE

## 2021-01-09 PROCEDURE — 63600175 PHARM REV CODE 636 W HCPCS: Performed by: EMERGENCY MEDICINE

## 2021-01-09 PROCEDURE — 80053 COMPREHEN METABOLIC PANEL: CPT

## 2021-01-09 PROCEDURE — 96375 TX/PRO/DX INJ NEW DRUG ADDON: CPT

## 2021-01-09 PROCEDURE — 12000002 HC ACUTE/MED SURGE SEMI-PRIVATE ROOM

## 2021-01-09 PROCEDURE — 25000003 PHARM REV CODE 250: Performed by: FAMILY MEDICINE

## 2021-01-09 PROCEDURE — 96366 THER/PROPH/DIAG IV INF ADDON: CPT

## 2021-01-09 PROCEDURE — 85025 COMPLETE CBC W/AUTO DIFF WBC: CPT

## 2021-01-09 PROCEDURE — 99220 PR INITIAL OBSERVATION CARE,LEVL III: CPT | Mod: ,,, | Performed by: FAMILY MEDICINE

## 2021-01-09 PROCEDURE — U0002 COVID-19 LAB TEST NON-CDC: HCPCS

## 2021-01-09 PROCEDURE — 99291 CRITICAL CARE FIRST HOUR: CPT | Mod: 25

## 2021-01-09 RX ORDER — LANOLIN ALCOHOL/MO/W.PET/CERES
800 CREAM (GRAM) TOPICAL
Status: DISCONTINUED | OUTPATIENT
Start: 2021-01-09 | End: 2021-01-10 | Stop reason: HOSPADM

## 2021-01-09 RX ORDER — SODIUM,POTASSIUM PHOSPHATES 280-250MG
2 POWDER IN PACKET (EA) ORAL
Status: DISCONTINUED | OUTPATIENT
Start: 2021-01-09 | End: 2021-01-10 | Stop reason: HOSPADM

## 2021-01-09 RX ORDER — IBUPROFEN 200 MG
24 TABLET ORAL
Status: DISCONTINUED | OUTPATIENT
Start: 2021-01-09 | End: 2021-01-10 | Stop reason: HOSPADM

## 2021-01-09 RX ORDER — ONDANSETRON 8 MG/1
8 TABLET, ORALLY DISINTEGRATING ORAL EVERY 8 HOURS PRN
Status: DISCONTINUED | OUTPATIENT
Start: 2021-01-09 | End: 2021-01-10 | Stop reason: HOSPADM

## 2021-01-09 RX ORDER — ACETAMINOPHEN 325 MG/1
650 TABLET ORAL EVERY 4 HOURS PRN
Status: DISCONTINUED | OUTPATIENT
Start: 2021-01-09 | End: 2021-01-10 | Stop reason: HOSPADM

## 2021-01-09 RX ORDER — LORAZEPAM 2 MG/ML
1 INJECTION INTRAMUSCULAR EVERY 6 HOURS PRN
Status: DISCONTINUED | OUTPATIENT
Start: 2021-01-09 | End: 2021-01-10 | Stop reason: HOSPADM

## 2021-01-09 RX ORDER — SODIUM CHLORIDE 0.9 % (FLUSH) 0.9 %
10 SYRINGE (ML) INJECTION
Status: DISCONTINUED | OUTPATIENT
Start: 2021-01-09 | End: 2021-01-10 | Stop reason: HOSPADM

## 2021-01-09 RX ORDER — LEVETIRACETAM 10 MG/ML
1000 INJECTION INTRAVASCULAR
Status: COMPLETED | OUTPATIENT
Start: 2021-01-09 | End: 2021-01-09

## 2021-01-09 RX ORDER — ONDANSETRON 2 MG/ML
4 INJECTION INTRAMUSCULAR; INTRAVENOUS
Status: COMPLETED | OUTPATIENT
Start: 2021-01-09 | End: 2021-01-09

## 2021-01-09 RX ORDER — GLUCAGON 1 MG
1 KIT INJECTION
Status: DISCONTINUED | OUTPATIENT
Start: 2021-01-09 | End: 2021-01-10 | Stop reason: HOSPADM

## 2021-01-09 RX ORDER — MUPIROCIN 20 MG/G
OINTMENT TOPICAL 2 TIMES DAILY
Status: DISCONTINUED | OUTPATIENT
Start: 2021-01-09 | End: 2021-01-10 | Stop reason: HOSPADM

## 2021-01-09 RX ORDER — IBUPROFEN 200 MG
16 TABLET ORAL
Status: DISCONTINUED | OUTPATIENT
Start: 2021-01-09 | End: 2021-01-10 | Stop reason: HOSPADM

## 2021-01-09 RX ADMIN — ONDANSETRON 4 MG: 2 INJECTION INTRAMUSCULAR; INTRAVENOUS at 02:01

## 2021-01-09 RX ADMIN — ACETAMINOPHEN 650 MG: 325 TABLET ORAL at 11:01

## 2021-01-09 RX ADMIN — LEVETIRACETAM INJECTION 1000 MG: 10 INJECTION INTRAVENOUS at 04:01

## 2021-01-09 RX ADMIN — MUPIROCIN: 20 OINTMENT TOPICAL at 08:01

## 2021-01-10 VITALS
WEIGHT: 201.75 LBS | DIASTOLIC BLOOD PRESSURE: 68 MMHG | HEART RATE: 78 BPM | RESPIRATION RATE: 18 BRPM | OXYGEN SATURATION: 94 % | TEMPERATURE: 99 F | SYSTOLIC BLOOD PRESSURE: 137 MMHG | BODY MASS INDEX: 25.89 KG/M2 | HEIGHT: 74 IN

## 2021-01-10 LAB
ALBUMIN SERPL BCP-MCNC: 4.5 G/DL (ref 3.5–5.2)
ALP SERPL-CCNC: 70 U/L (ref 55–135)
ALT SERPL W/O P-5'-P-CCNC: 14 U/L (ref 10–44)
AMPHET+METHAMPHET UR QL: NEGATIVE
ANION GAP SERPL CALC-SCNC: 14 MMOL/L (ref 8–16)
AST SERPL-CCNC: 14 U/L (ref 10–40)
BARBITURATES UR QL SCN>200 NG/ML: NEGATIVE
BASOPHILS # BLD AUTO: 0.03 K/UL (ref 0–0.2)
BASOPHILS NFR BLD: 0.3 % (ref 0–1.9)
BENZODIAZ UR QL SCN>200 NG/ML: NEGATIVE
BILIRUB SERPL-MCNC: 0.9 MG/DL (ref 0.1–1)
BUN SERPL-MCNC: 16 MG/DL (ref 6–20)
BZE UR QL SCN: NEGATIVE
CALCIUM SERPL-MCNC: 9.3 MG/DL (ref 8.7–10.5)
CANNABINOIDS UR QL SCN: NEGATIVE
CHLORIDE SERPL-SCNC: 107 MMOL/L (ref 95–110)
CO2 SERPL-SCNC: 22 MMOL/L (ref 23–29)
CREAT SERPL-MCNC: 0.9 MG/DL (ref 0.5–1.4)
CREAT UR-MCNC: 392.9 MG/DL (ref 23–375)
DIFFERENTIAL METHOD: NORMAL
EOSINOPHIL # BLD AUTO: 0 K/UL (ref 0–0.5)
EOSINOPHIL NFR BLD: 0.1 % (ref 0–8)
ERYTHROCYTE [DISTWIDTH] IN BLOOD BY AUTOMATED COUNT: 14 % (ref 11.5–14.5)
EST. GFR  (AFRICAN AMERICAN): >60 ML/MIN/1.73 M^2
EST. GFR  (NON AFRICAN AMERICAN): >60 ML/MIN/1.73 M^2
GLUCOSE SERPL-MCNC: 85 MG/DL (ref 70–110)
HCT VFR BLD AUTO: 44.5 % (ref 40–54)
HGB BLD-MCNC: 14.6 G/DL (ref 14–18)
IMM GRANULOCYTES # BLD AUTO: 0.04 K/UL (ref 0–0.04)
IMM GRANULOCYTES NFR BLD AUTO: 0.4 % (ref 0–0.5)
LYMPHOCYTES # BLD AUTO: 2.6 K/UL (ref 1–4.8)
LYMPHOCYTES NFR BLD: 25.8 % (ref 18–48)
MCH RBC QN AUTO: 29.1 PG (ref 27–31)
MCHC RBC AUTO-ENTMCNC: 32.8 G/DL (ref 32–36)
MCV RBC AUTO: 89 FL (ref 82–98)
METHADONE UR QL SCN>300 NG/ML: ABNORMAL
MONOCYTES # BLD AUTO: 0.6 K/UL (ref 0.3–1)
MONOCYTES NFR BLD: 6.2 % (ref 4–15)
NEUTROPHILS # BLD AUTO: 6.8 K/UL (ref 1.8–7.7)
NEUTROPHILS NFR BLD: 67.2 % (ref 38–73)
NRBC BLD-RTO: 0 /100 WBC
OPIATES UR QL SCN: NEGATIVE
PCP UR QL SCN>25 NG/ML: NEGATIVE
PLATELET # BLD AUTO: 248 K/UL (ref 150–350)
PMV BLD AUTO: 11.2 FL (ref 9.2–12.9)
POTASSIUM SERPL-SCNC: 4 MMOL/L (ref 3.5–5.1)
PROT SERPL-MCNC: 8.2 G/DL (ref 6–8.4)
RBC # BLD AUTO: 5.01 M/UL (ref 4.6–6.2)
SODIUM SERPL-SCNC: 143 MMOL/L (ref 136–145)
TOXICOLOGY INFORMATION: ABNORMAL
WBC # BLD AUTO: 10.05 K/UL (ref 3.9–12.7)

## 2021-01-10 PROCEDURE — G0378 HOSPITAL OBSERVATION PER HR: HCPCS

## 2021-01-10 PROCEDURE — 99223 PR INITIAL HOSPITAL CARE,LEVL III: ICD-10-PCS | Mod: ,,, | Performed by: NEUROLOGICAL SURGERY

## 2021-01-10 PROCEDURE — 36415 COLL VENOUS BLD VENIPUNCTURE: CPT

## 2021-01-10 PROCEDURE — 99223 1ST HOSP IP/OBS HIGH 75: CPT | Mod: ,,, | Performed by: ORTHOPAEDIC SURGERY

## 2021-01-10 PROCEDURE — 85025 COMPLETE CBC W/AUTO DIFF WBC: CPT

## 2021-01-10 PROCEDURE — 99223 1ST HOSP IP/OBS HIGH 75: CPT | Mod: ,,, | Performed by: NEUROLOGICAL SURGERY

## 2021-01-10 PROCEDURE — 99223 PR INITIAL HOSPITAL CARE,LEVL III: ICD-10-PCS | Mod: ,,, | Performed by: ORTHOPAEDIC SURGERY

## 2021-01-10 PROCEDURE — 80307 DRUG TEST PRSMV CHEM ANLYZR: CPT

## 2021-01-10 PROCEDURE — 80053 COMPREHEN METABOLIC PANEL: CPT

## 2021-01-10 RX ADMIN — MUPIROCIN: 20 OINTMENT TOPICAL at 09:01

## 2021-01-11 ENCOUNTER — TELEPHONE (OUTPATIENT)
Dept: NEUROSURGERY | Facility: CLINIC | Age: 36
End: 2021-01-11

## 2021-01-11 DIAGNOSIS — S06.4X0D TRAUMATIC EPIDURAL HEMATOMA WITHOUT LOSS OF CONSCIOUSNESS, SUBSEQUENT ENCOUNTER: Primary | ICD-10-CM

## 2021-02-03 DIAGNOSIS — S62.330A CLOSED DISPLACED FRACTURE OF NECK OF SECOND METACARPAL BONE OF RIGHT HAND, INITIAL ENCOUNTER: Primary | ICD-10-CM

## 2021-02-09 ENCOUNTER — CLINICAL SUPPORT (OUTPATIENT)
Dept: REHABILITATION | Facility: HOSPITAL | Age: 36
End: 2021-02-09
Payer: MEDICAID

## 2021-02-09 DIAGNOSIS — M25.641 STIFFNESS OF FINGER JOINT, RIGHT: Primary | ICD-10-CM

## 2021-02-09 PROCEDURE — 97165 OT EVAL LOW COMPLEX 30 MIN: CPT

## 2021-02-23 ENCOUNTER — DOCUMENTATION ONLY (OUTPATIENT)
Dept: REHABILITATION | Facility: HOSPITAL | Age: 36
End: 2021-02-23

## 2021-03-10 ENCOUNTER — DOCUMENTATION ONLY (OUTPATIENT)
Dept: REHABILITATION | Facility: HOSPITAL | Age: 36
End: 2021-03-10

## 2021-04-21 ENCOUNTER — DOCUMENTATION ONLY (OUTPATIENT)
Dept: REHABILITATION | Facility: HOSPITAL | Age: 36
End: 2021-04-21

## 2021-04-21 PROBLEM — M25.641 STIFFNESS OF FINGER JOINT, RIGHT: Status: RESOLVED | Noted: 2021-02-09 | Resolved: 2021-04-21

## 2021-04-29 ENCOUNTER — PATIENT MESSAGE (OUTPATIENT)
Dept: RESEARCH | Facility: HOSPITAL | Age: 36
End: 2021-04-29

## 2022-11-14 ENCOUNTER — OFFICE VISIT (OUTPATIENT)
Dept: FAMILY MEDICINE | Facility: CLINIC | Age: 37
End: 2022-11-14
Payer: MEDICAID

## 2022-11-14 VITALS
HEART RATE: 80 BPM | WEIGHT: 229.19 LBS | HEIGHT: 76 IN | OXYGEN SATURATION: 97 % | BODY MASS INDEX: 27.91 KG/M2 | DIASTOLIC BLOOD PRESSURE: 78 MMHG | SYSTOLIC BLOOD PRESSURE: 116 MMHG

## 2022-11-14 DIAGNOSIS — Z23 NEED FOR TDAP VACCINATION: ICD-10-CM

## 2022-11-14 DIAGNOSIS — Z11.59 ENCOUNTER FOR HEPATITIS C SCREENING TEST FOR LOW RISK PATIENT: ICD-10-CM

## 2022-11-14 DIAGNOSIS — N46.9 INFERTILITY MALE: Primary | ICD-10-CM

## 2022-11-14 DIAGNOSIS — Z00.00 ROUTINE HEALTH MAINTENANCE: ICD-10-CM

## 2022-11-14 PROCEDURE — 1159F MED LIST DOCD IN RCRD: CPT | Mod: CPTII,,, | Performed by: NURSE PRACTITIONER

## 2022-11-14 PROCEDURE — 3074F SYST BP LT 130 MM HG: CPT | Mod: CPTII,,, | Performed by: NURSE PRACTITIONER

## 2022-11-14 PROCEDURE — 99204 PR OFFICE/OUTPT VISIT, NEW, LEVL IV, 45-59 MIN: ICD-10-PCS | Mod: S$PBB,,, | Performed by: NURSE PRACTITIONER

## 2022-11-14 PROCEDURE — 1160F RVW MEDS BY RX/DR IN RCRD: CPT | Mod: CPTII,,, | Performed by: NURSE PRACTITIONER

## 2022-11-14 PROCEDURE — 3078F DIAST BP <80 MM HG: CPT | Mod: CPTII,,, | Performed by: NURSE PRACTITIONER

## 2022-11-14 PROCEDURE — 99204 OFFICE O/P NEW MOD 45 MIN: CPT | Mod: S$PBB,,, | Performed by: NURSE PRACTITIONER

## 2022-11-14 PROCEDURE — 3078F PR MOST RECENT DIASTOLIC BLOOD PRESSURE < 80 MM HG: ICD-10-PCS | Mod: CPTII,,, | Performed by: NURSE PRACTITIONER

## 2022-11-14 PROCEDURE — 1160F PR REVIEW ALL MEDS BY PRESCRIBER/CLIN PHARMACIST DOCUMENTED: ICD-10-PCS | Mod: CPTII,,, | Performed by: NURSE PRACTITIONER

## 2022-11-14 PROCEDURE — 3074F PR MOST RECENT SYSTOLIC BLOOD PRESSURE < 130 MM HG: ICD-10-PCS | Mod: CPTII,,, | Performed by: NURSE PRACTITIONER

## 2022-11-14 PROCEDURE — 3008F BODY MASS INDEX DOCD: CPT | Mod: CPTII,,, | Performed by: NURSE PRACTITIONER

## 2022-11-14 PROCEDURE — 99214 OFFICE O/P EST MOD 30 MIN: CPT | Performed by: NURSE PRACTITIONER

## 2022-11-14 PROCEDURE — 3008F PR BODY MASS INDEX (BMI) DOCUMENTED: ICD-10-PCS | Mod: CPTII,,, | Performed by: NURSE PRACTITIONER

## 2022-11-14 PROCEDURE — 1159F PR MEDICATION LIST DOCUMENTED IN MEDICAL RECORD: ICD-10-PCS | Mod: CPTII,,, | Performed by: NURSE PRACTITIONER

## 2022-11-14 PROCEDURE — 90715 TDAP VACCINE 7 YRS/> IM: CPT | Mod: PBBFAC | Performed by: NURSE PRACTITIONER

## 2022-11-15 NOTE — PROGRESS NOTES
SUBJECTIVE:      Patient ID: Srinivasa Bui is a 37 y.o. male.    Chief Complaint: Establish Care (New patient - establishing with a new pcp - needing a referral to urology)    Presents to establish care, states he has not had a PCP prior to this, concerned about fertility issues, states his ex & current girlfriend have not been able to conceive children while with him and he's wondering if he has any male fertility problems, admits to past drug use, 2 PPD smoker x 15 yrs, occasional EtOH, no illicit or prescription drug use at this time    Discussed outstanding health maintenance     Male  Problem  The patient's pertinent negatives include no penile discharge, penile pain, scrotal swelling or testicular pain. Primary symptoms comment: unable to get partners pregnant despite actively trying. This is a chronic problem. The current episode started more than 1 year ago. The problem occurs constantly. The problem has been unchanged. The patient is experiencing no pain. Pertinent negatives include no abdominal pain, chest pain, constipation, coughing, diarrhea, dysuria, fever, flank pain, frequency, headaches, nausea, rash, shortness of breath, sore throat, urgency or vomiting. He is sexually active. He never uses condoms. It is unknown whether or not his partner has an STD. There is no history of erectile dysfunction or kidney stones.     Past Surgical History:   Procedure Laterality Date    BREAST SURGERY      I & D right     Family History   Problem Relation Age of Onset    Diabetes Father     Heart disease Father       Social History     Socioeconomic History    Marital status: Single   Tobacco Use    Smoking status: Every Day     Packs/day: 2.00     Years: 15.00     Pack years: 30.00     Types: Cigarettes    Smokeless tobacco: Never   Substance and Sexual Activity    Alcohol use: Yes     Comment: occasional    Drug use: Not Currently     Types: Heroin    Sexual activity: Yes     No current outpatient  medications on file.     No current facility-administered medications for this visit.     Review of patient's allergies indicates:  No Known Allergies   Past Medical History:   Diagnosis Date    Anxiety     depression    Breast abscess in male     right    Opioid dependence      Past Surgical History:   Procedure Laterality Date    BREAST SURGERY      I & D right       Review of Systems   Constitutional:  Negative for activity change, appetite change, fatigue and fever.   HENT:  Negative for congestion, ear pain, hearing loss, postnasal drip, sinus pressure, sinus pain, sneezing and sore throat.    Eyes:  Negative for photophobia and pain.   Respiratory:  Negative for cough, chest tightness, shortness of breath and wheezing.    Cardiovascular:  Negative for chest pain and leg swelling.   Gastrointestinal:  Negative for abdominal distention, abdominal pain, blood in stool, constipation, diarrhea, nausea and vomiting.   Endocrine: Negative for cold intolerance, heat intolerance, polydipsia and polyuria.   Genitourinary:  Negative for decreased urine volume, difficulty urinating, dysuria, enuresis, flank pain, frequency, genital sores, hematuria, penile discharge, penile pain, penile swelling, scrotal swelling, testicular pain and urgency.   Musculoskeletal:  Negative for arthralgias, back pain, joint swelling, myalgias and neck pain.   Skin:  Negative for pallor and rash.   Allergic/Immunologic: Negative for environmental allergies and food allergies.   Neurological:  Negative for dizziness, weakness, light-headedness and headaches.   Hematological:  Does not bruise/bleed easily.   Psychiatric/Behavioral:  Negative for confusion, decreased concentration and sleep disturbance. The patient is not nervous/anxious.     OBJECTIVE:      Vitals:    11/14/22 1304   BP: 116/78   BP Location: Right arm   Patient Position: Sitting   BP Method: Large (Manual)   Pulse: 80   SpO2: 97%   Weight: 104 kg (229 lb 3.2 oz)   Height: 6'  "4" (1.93 m)     Physical Exam  Vitals and nursing note reviewed.   Constitutional:       General: He is not in acute distress.     Appearance: Normal appearance. He is well-developed, well-groomed and overweight.   HENT:      Head: Normocephalic and atraumatic.      Right Ear: Hearing normal.      Left Ear: Hearing normal.      Nose: Nose normal. No rhinorrhea.   Eyes:      General: Lids are normal.         Right eye: No discharge.         Left eye: No discharge.      Conjunctiva/sclera: Conjunctivae normal.      Right eye: Right conjunctiva is not injected.      Left eye: Left conjunctiva is not injected.      Pupils: Pupils are equal, round, and reactive to light. Pupils are equal.      Right eye: Pupil is round and reactive.      Left eye: Pupil is round and reactive.   Neck:      Thyroid: No thyromegaly.      Vascular: No JVD.      Trachea: Trachea normal. No tracheal deviation.   Cardiovascular:      Rate and Rhythm: Normal rate and regular rhythm.      Pulses:           Radial pulses are 2+ on the right side and 2+ on the left side.        Posterior tibial pulses are 2+ on the right side and 2+ on the left side.      Heart sounds: Normal heart sounds. No murmur heard.    No friction rub. No gallop.   Pulmonary:      Effort: Pulmonary effort is normal. No respiratory distress.      Breath sounds: Normal breath sounds. No stridor. No decreased breath sounds, wheezing, rhonchi or rales.   Abdominal:      General: Bowel sounds are normal. There is no distension.      Palpations: Abdomen is soft. Abdomen is not rigid.      Tenderness: There is no abdominal tenderness. There is no guarding.   Musculoskeletal:         General: Normal range of motion.      Cervical back: Normal range of motion and neck supple.   Lymphadenopathy:      Cervical: No cervical adenopathy.   Skin:     General: Skin is warm and dry.      Capillary Refill: Capillary refill takes less than 2 seconds.      Coloration: Skin is not pale.      " Findings: No lesion or rash.   Neurological:      Mental Status: He is alert and oriented to person, place, and time.      GCS: GCS eye subscore is 4. GCS verbal subscore is 5. GCS motor subscore is 6.      Motor: Motor function is intact. No atrophy.      Coordination: Coordination is intact. Coordination normal.      Gait: Gait is intact. Gait normal.   Psychiatric:         Attention and Perception: Attention normal. He is attentive.         Mood and Affect: Mood is anxious.         Speech: Speech normal.         Behavior: Behavior normal.         Thought Content: Thought content normal.         Cognition and Memory: Cognition normal.         Judgment: Judgment normal.      Assessment:       1. Infertility male    2. Routine health maintenance    3. Encounter for hepatitis C screening test for low risk patient    4. Need for Tdap vaccination          Plan:       Infertility male  -     Ambulatory referral/consult to Urology; Future; Expected date: 11/21/2022    Routine health maintenance  -     CBC Auto Differential; Future; Expected date: 11/21/2022  -     Lipid Panel; Future; Expected date: 11/14/2022  -     Comprehensive Metabolic Panel; Future; Expected date: 11/14/2022    Encounter for hepatitis C screening test for low risk patient  -     Hepatitis C Antibody; Future; Expected date: 11/14/2022    Need for Tdap vaccination  -     Tdap Vaccine      Follow up in about 1 year (around 11/14/2023) for well exam.      11/15/2022 EV Medel, NIIP-C

## 2023-05-21 ENCOUNTER — HOSPITAL ENCOUNTER (EMERGENCY)
Facility: HOSPITAL | Age: 38
Discharge: HOME | End: 2023-05-22
Attending: STUDENT IN AN ORGANIZED HEALTH CARE EDUCATION/TRAINING PROGRAM
Payer: MEDICAID

## 2023-05-21 DIAGNOSIS — G89.18 POST-OP PAIN: ICD-10-CM

## 2023-05-21 DIAGNOSIS — Z98.890 HISTORY OF FASCIOTOMY: Primary | ICD-10-CM

## 2023-05-21 PROCEDURE — 96374 THER/PROPH/DIAG INJ IV PUSH: CPT

## 2023-05-21 PROCEDURE — 96375 TX/PRO/DX INJ NEW DRUG ADDON: CPT

## 2023-05-21 PROCEDURE — 99284 EMERGENCY DEPT VISIT MOD MDM: CPT

## 2023-05-22 VITALS
WEIGHT: 240 LBS | SYSTOLIC BLOOD PRESSURE: 168 MMHG | OXYGEN SATURATION: 99 % | DIASTOLIC BLOOD PRESSURE: 123 MMHG | RESPIRATION RATE: 25 BRPM | BODY MASS INDEX: 29.22 KG/M2 | HEART RATE: 85 BPM | TEMPERATURE: 98 F | HEIGHT: 76 IN

## 2023-05-22 LAB
ALBUMIN SERPL BCP-MCNC: 4 G/DL (ref 3.5–5.2)
ALP SERPL-CCNC: 51 U/L (ref 55–135)
ALT SERPL W/O P-5'-P-CCNC: 25 U/L (ref 10–44)
ANION GAP SERPL CALC-SCNC: 10 MMOL/L (ref 8–16)
AST SERPL-CCNC: 41 U/L (ref 10–40)
BASOPHILS # BLD AUTO: 0.03 K/UL (ref 0–0.2)
BASOPHILS NFR BLD: 0.3 % (ref 0–1.9)
BILIRUB SERPL-MCNC: 1.2 MG/DL (ref 0.1–1)
BUN SERPL-MCNC: 14 MG/DL (ref 6–20)
CALCIUM SERPL-MCNC: 8.9 MG/DL (ref 8.7–10.5)
CHLORIDE SERPL-SCNC: 105 MMOL/L (ref 95–110)
CO2 SERPL-SCNC: 22 MMOL/L (ref 23–29)
CREAT SERPL-MCNC: 1.4 MG/DL (ref 0.5–1.4)
DIFFERENTIAL METHOD: ABNORMAL
EOSINOPHIL # BLD AUTO: 0.1 K/UL (ref 0–0.5)
EOSINOPHIL NFR BLD: 1.1 % (ref 0–8)
ERYTHROCYTE [DISTWIDTH] IN BLOOD BY AUTOMATED COUNT: 12.9 % (ref 11.5–14.5)
EST. GFR  (NO RACE VARIABLE): >60 ML/MIN/1.73 M^2
GLUCOSE SERPL-MCNC: 92 MG/DL (ref 70–110)
HCT VFR BLD AUTO: 40.1 % (ref 40–54)
HGB BLD-MCNC: 13.6 G/DL (ref 14–18)
IMM GRANULOCYTES # BLD AUTO: 0.02 K/UL (ref 0–0.04)
IMM GRANULOCYTES NFR BLD AUTO: 0.2 % (ref 0–0.5)
LYMPHOCYTES # BLD AUTO: 2.2 K/UL (ref 1–4.8)
LYMPHOCYTES NFR BLD: 25.2 % (ref 18–48)
MCH RBC QN AUTO: 31.8 PG (ref 27–31)
MCHC RBC AUTO-ENTMCNC: 33.9 G/DL (ref 32–36)
MCV RBC AUTO: 94 FL (ref 82–98)
MONOCYTES # BLD AUTO: 1 K/UL (ref 0.3–1)
MONOCYTES NFR BLD: 11.2 % (ref 4–15)
NEUTROPHILS # BLD AUTO: 5.4 K/UL (ref 1.8–7.7)
NEUTROPHILS NFR BLD: 62 % (ref 38–73)
NRBC BLD-RTO: 0 /100 WBC
PLATELET # BLD AUTO: 259 K/UL (ref 150–450)
PMV BLD AUTO: 10.8 FL (ref 9.2–12.9)
POTASSIUM SERPL-SCNC: 4.6 MMOL/L (ref 3.5–5.1)
PROT SERPL-MCNC: 7.2 G/DL (ref 6–8.4)
RBC # BLD AUTO: 4.28 M/UL (ref 4.6–6.2)
SODIUM SERPL-SCNC: 137 MMOL/L (ref 136–145)
WBC # BLD AUTO: 8.78 K/UL (ref 3.9–12.7)

## 2023-05-22 PROCEDURE — 80053 COMPREHEN METABOLIC PANEL: CPT | Performed by: EMERGENCY MEDICINE

## 2023-05-22 PROCEDURE — 63600175 PHARM REV CODE 636 W HCPCS: Performed by: STUDENT IN AN ORGANIZED HEALTH CARE EDUCATION/TRAINING PROGRAM

## 2023-05-22 PROCEDURE — 85025 COMPLETE CBC W/AUTO DIFF WBC: CPT | Performed by: EMERGENCY MEDICINE

## 2023-05-22 RX ORDER — KETOROLAC TROMETHAMINE 30 MG/ML
15 INJECTION, SOLUTION INTRAMUSCULAR; INTRAVENOUS
Status: COMPLETED | OUTPATIENT
Start: 2023-05-22 | End: 2023-05-22

## 2023-05-22 RX ORDER — HYDROMORPHONE HYDROCHLORIDE 1 MG/ML
2 INJECTION, SOLUTION INTRAMUSCULAR; INTRAVENOUS; SUBCUTANEOUS
Status: COMPLETED | OUTPATIENT
Start: 2023-05-22 | End: 2023-05-22

## 2023-05-22 RX ADMIN — HYDROMORPHONE HYDROCHLORIDE 2 MG: 1 INJECTION, SOLUTION INTRAMUSCULAR; INTRAVENOUS; SUBCUTANEOUS at 12:05

## 2023-05-22 RX ADMIN — KETOROLAC TROMETHAMINE 15 MG: 30 INJECTION, SOLUTION INTRAMUSCULAR; INTRAVENOUS at 12:05

## 2023-05-22 NOTE — ED PROVIDER NOTES
Encounter Date: 5/21/2023       History     Chief Complaint   Patient presents with    Arm Injury     Had surgery today on his LUE at [facility] today to decompress (injected drugs into an artery at some point). A couple hours ago started experiencing severe pain and swelling, pt AMA'd from [facility] after procedure.      38-year-old male presents for ongoing left hand pain.  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  That was a few hours ago.  He went home and then decided to come back to hospital.  He was initially going to go back to [facility] but then the pain was so bad he came here for further evaluation.    Review of patient's allergies indicates:  No Known Allergies  Past Medical History:   Diagnosis Date    Anxiety     depression    Breast abscess in male     right    Opioid dependence      Past Surgical History:   Procedure Laterality Date    BREAST SURGERY      I & D right     Family History   Problem Relation Age of Onset    Diabetes Father     Heart disease Father      Social History     Tobacco Use    Smoking status: Every Day     Packs/day: 2.00     Years: 15.00     Pack years: 30.00     Types: Cigarettes    Smokeless tobacco: Never   Substance Use Topics    Alcohol use: Yes     Comment: occasional    Drug use: Not Currently     Types: Heroin     Review of Systems   Musculoskeletal:  Positive for arthralgias and myalgias.     Physical Exam     Initial Vitals [05/21/23 2355]   BP Pulse Resp Temp SpO2   (!) 151/90 84 (!) 22 98.1 °F (36.7 °C) 98 %      MAP       --         Physical Exam    Constitutional: He appears distressed.   Musculoskeletal:      Comments: Left hand fasciotomy wound clean dry and intact     Skin: No rash and no abscess noted. No pallor.       ED Course    Procedures  Labs Reviewed   CBC W/ AUTO DIFFERENTIAL - Abnormal; Notable for the following components:       Result Value    RBC 4.28 (*)     Hemoglobin 13.6 (*)     MCH 31.8 (*)     All other components within normal limits   COMPREHENSIVE METABOLIC PANEL - Abnormal; Notable for the following components:    CO2 22 (*)     Total Bilirubin 1.2 (*)     Alkaline Phosphatase 51 (*)     AST 41 (*)     All other components within normal limits          Imaging Results              X-Ray Hand 3 View Left (In process)  Result time 05/22/23 00:54:59   Procedure changed from X-Ray Hand 2 View Left                    Medications   HYDROmorphone injection 2 mg (2 mg Intravenous Given 5/22/23 0029)   ketorolac injection 15 mg (15 mg Intravenous Given 5/22/23 0053)                            38-year-old male presents for evaluation of postoperative hand pain after fasciotomy of the left hand done at [facility] for left hand compartment syndrome.  He left against medical advice from [place] this morning and the nerve block wore off this evening.  On my exam, patient is in excruciating pain.  I gave him 2 mg Dilaudid and Toradol with some improvement in symptoms.  Exam consistent with recent fasciotomy without evidence of overlying infection.  CBC and CMP within normal limits.  X-ray without evidence new fracture foreign body.  Initially attempted to transfer patient to [facility] but [place] is on ED saturation.  When I advised the patient, he said he would like to leave against medical advice and drive himself to [place] to check back in.    Due to wait time patient decided to leave Against Medical Advice (AMA).    I saw the patient and I discussed the risks of AMA. The patient has decision making capacity and was able to verbalize to me understood the risks of not being seen today: including but not limited to threat to life, limb and permanent disability, and loss of hand.    In absence  of receiving treatment here under close observation here in the emergency department, I have tried to arrange the following: pain and antibiotic medication, close outpatient follow-up with orthopedics. Patient states he's going to drive directly to [facility] for continuity of care and follow up with orthopedics.     I discussed further follow up options for the patient and reminded them that they should return to the ED with any new or worsening symptoms.They verbalized their understanding back to me and know they can return at any time, including as soon as they leave the department now.     They were given the opportunity to ask questions, which were reasonably addressed to the best of my ability and their apparent satisfaction.      Clinical Impression:   Final diagnoses:  [G89.18] Post-op pain  [Z98.890] History of fasciotomy (Primary)        ED Disposition Condition    AMA Stable                Sai Dow MD  05/22/23 3504

## 2024-07-09 ENCOUNTER — PATIENT MESSAGE (OUTPATIENT)
Dept: ADMINISTRATIVE | Facility: HOSPITAL | Age: 39
End: 2024-07-09

## 2025-04-13 ENCOUNTER — ON-DEMAND VIRTUAL (OUTPATIENT)
Dept: URGENT CARE | Facility: CLINIC | Age: 40
End: 2025-04-13

## 2025-04-13 DIAGNOSIS — L03.221 CELLULITIS, NECK: Primary | ICD-10-CM

## 2025-04-13 DIAGNOSIS — F19.90 IVDU (INTRAVENOUS DRUG USER): ICD-10-CM

## 2025-04-13 PROCEDURE — 98005 SYNCH AUDIO-VIDEO EST LOW 20: CPT | Mod: 95,,, | Performed by: NURSE PRACTITIONER

## 2025-04-13 RX ORDER — CEFUROXIME AXETIL 500 MG/1
500 TABLET ORAL EVERY 12 HOURS
Qty: 20 TABLET | Refills: 0 | Status: SHIPPED | OUTPATIENT
Start: 2025-04-13 | End: 2025-04-23

## 2025-04-14 NOTE — PROGRESS NOTES
Subjective:      Patient ID: Srinivasa Bui is a 39 y.o. male.    Vitals:  vitals were not taken for this visit.     Chief Complaint: Skin Problem      Visit Type: TELE AUDIOVISUAL    Past Medical History:   Diagnosis Date    Anxiety     depression    Breast abscess in male     right    Opioid dependence      Past Surgical History:   Procedure Laterality Date    BREAST SURGERY      I & D right     Review of patient's allergies indicates:  No Known Allergies  Medications Ordered Prior to Encounter[1]  Family History   Problem Relation Name Age of Onset    Diabetes Father      Heart disease Father         Medications Ordered                [id number] - [facility] [address] [facility] [address] [facility]   [address] [address]    Telephone: [phone]   Fax: [phone]   Hours: Open 24 hours                         E-Prescribed (1 of 1)              cefUROXime (CEFTIN) 500 MG tablet    Sig: Take 1 tablet (500 mg total) by mouth every 12 (twelve) hours. for 10 days       Start: 4/13/25     Quantity: 20 tablet Refills: 0                           Ohs Peq Odvv Intake    4/13/2025  8:50 PM CDT - Filed by Patient   What is your current physical address in the event of a medical emergency? [address]   Are you able to take your vital signs? No   Please attach any relevant images or files    Is your employer contracted with [facility]? No         Presents with c/o cellulitis to neck after IVDU.  States he recently relapsed.  Denies fever.    Two patient identifiers were used-name was repeated verbally as well as date of birth.  The patient was located in their home in the [facility].          Skin:  Positive for color change and erythema.        Objective:   The physical exam was conducted virtually.  Physical Exam   Constitutional: He is oriented to person, place, and time. No distress.   HENT:   Head: Normocephalic and atraumatic.   Neck: Neck supple.    Pulmonary/Chest: Effort normal. No respiratory distress.   Abdominal: Normal appearance.   Musculoskeletal: Normal range of motion.         General: Normal range of motion.   Neurological: no focal deficit. He is alert and oriented to person, place, and time.   Skin: Skin is not pale. erythema   Psychiatric: His behavior is normal. Mood, judgment and thought content normal.       Assessment:     1. Cellulitis, neck    2. IVDU (intravenous drug user)        Plan:       Cellulitis, neck    IVDU (intravenous drug user)    Other orders  -     cefUROXime (CEFTIN) 500 MG tablet; Take 1 tablet (500 mg total) by mouth every 12 (twelve) hours. for 10 days  Dispense: 20 tablet; Refill: 0    Cease IVDU.  Meds as above.  F/u in clinic if symptoms fail to resolve with treatment.  To ER if fever develops or symptoms worsen.    You must understand that you've received a virtual Care treatment only and that you may be released before all your medical problems are known or treated. You, the patient, will arrange for follow up care as instructed.  If your condition worsens we recommend that you receive another evaluation at an urgent care in person, the emergency room or contact your primary medical clinics after hours call service to discuss your concerns.              Present with the patient at the time of consultation: TELEMED PRESENT WITH PATIENT: None           [1]   No current outpatient medications on file prior to visit.     No current facility-administered medications on file prior to visit.